# Patient Record
Sex: FEMALE | Race: WHITE | NOT HISPANIC OR LATINO | Employment: OTHER | URBAN - NONMETROPOLITAN AREA
[De-identification: names, ages, dates, MRNs, and addresses within clinical notes are randomized per-mention and may not be internally consistent; named-entity substitution may affect disease eponyms.]

---

## 2023-10-13 ENCOUNTER — DOCUMENTATION (OUTPATIENT)
Dept: FAMILY MEDICINE CLINIC | Facility: CLINIC | Age: 83
End: 2023-10-13
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
## 2023-10-13 PROBLEM — A41.9 SEPSIS: Chronic | Status: ACTIVE | Noted: 2023-10-03

## 2023-10-13 PROBLEM — K83.09 CHOLANGITIS: Status: ACTIVE | Noted: 2023-10-13

## 2023-10-13 PROBLEM — S22.050D: Status: ACTIVE | Noted: 2023-10-13

## 2023-10-13 PROBLEM — E86.0 DEHYDRATION: Status: ACTIVE | Noted: 2023-10-13

## 2023-10-13 PROBLEM — K83.1 COMMON BILE DUCT (CBD) OBSTRUCTION: Status: ACTIVE | Noted: 2023-10-03

## 2023-10-13 PROBLEM — I10 HYPERTENSION: Status: ACTIVE | Noted: 2023-10-13

## 2023-10-13 PROBLEM — S42.92XD: Status: ACTIVE | Noted: 2023-10-13

## 2023-10-13 PROBLEM — N39.0 URINARY TRACT INFECTION, SITE NOT SPECIFIED: Status: ACTIVE | Noted: 2023-10-13

## 2023-10-13 PROBLEM — G93.40 ENCEPHALOPATHY, UNSPECIFIED: Status: ACTIVE | Noted: 2023-10-13
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
## 2023-10-13 PROBLEM — M62.81 MUSCLE WEAKNESS (GENERALIZED): Status: ACTIVE | Noted: 2023-10-13

## 2023-10-13 RX ORDER — OXYCODONE HYDROCHLORIDE 5 MG/1
5 TABLET ORAL EVERY 8 HOURS PRN
COMMUNITY
End: 2023-10-17 | Stop reason: SDUPTHER

## 2023-10-13 RX ORDER — POLYETHYLENE GLYCOL 3350 17 G/17G
17 POWDER, FOR SOLUTION ORAL DAILY PRN
COMMUNITY

## 2023-10-13 RX ORDER — ACETAMINOPHEN 325 MG/1
650 TABLET ORAL EVERY 6 HOURS PRN
COMMUNITY

## 2023-10-13 RX ORDER — HEPARIN SODIUM 5000 [USP'U]/ML
1 INJECTION, SOLUTION INTRAVENOUS; SUBCUTANEOUS EVERY 12 HOURS SCHEDULED
COMMUNITY

## 2023-10-13 RX ORDER — AMLODIPINE BESYLATE 2.5 MG/1
2.5 TABLET ORAL 2 TIMES DAILY
COMMUNITY

## 2023-10-16 RX ORDER — LEVOFLOXACIN 500 MG/1
500 TABLET, FILM COATED ORAL DAILY
COMMUNITY

## 2023-10-16 RX ORDER — METRONIDAZOLE 500 MG/1
500 TABLET ORAL 3 TIMES DAILY
COMMUNITY

## 2023-10-16 RX ORDER — POTASSIUM CHLORIDE 20 MEQ/1
20 TABLET, EXTENDED RELEASE ORAL DAILY
COMMUNITY

## 2023-10-16 RX ORDER — PANTOPRAZOLE SODIUM 40 MG/1
40 TABLET, DELAYED RELEASE ORAL DAILY
COMMUNITY

## 2023-10-17 RX ORDER — OXYCODONE HYDROCHLORIDE 5 MG/1
5 TABLET ORAL EVERY 8 HOURS PRN
Qty: 90 TABLET | Refills: 0 | Status: SHIPPED | OUTPATIENT
Start: 2023-10-17

## 2023-10-17 NOTE — TELEPHONE ENCOUNTER
(NEW ADMIT)    [facility] REQUESTING MED REFILL FOR OXYCODONE 5 MG.    DIRECTIONS: OXYCODONE 5 MG, 1 TAB PO Q 8 HRS PRN.

## 2023-10-25 NOTE — PROGRESS NOTES
Nursing Home History and Physical       Vasquez Quinones DO  [address] [place] [address] Phone: [phone]  Fax: [phone]     PATIENT NAME: Ashley Younger                                                                          YOB: 1940           DATE OF SERVICE: 10/26/2023  FACILITY:  [facility]    CHIEF COMPLAINT:  Nursing facility admission      HISTORY OF PRESENT ILLNESS:   Patient is an 83-year-old female with a history of CKD stage III, brain aneurysm, orthostatic hypotension, recurrent falls, and arthritis recently hospitalized at [facility] for cholangitis with biliary stones.  Patient underwent ERCP with stone removal followed by 7 days of antibiotics for biliary infection.  Patient was also given follow-up with Dr. Bazan for surveillance radiographs 2 weeks from discharge.    On exam today, patient was resting comfortably in her wheelchair so with no specific complaints or concerns.  She seemed more distraught about comments made about her clothing.  She was sharing that she wanted to go to [facility] and obtain new clothing.  She was also focused on the care for her son who is also a facility resident at this time.          PAST MEDICAL & SURGICAL HISTORY:   Past Medical History:   Diagnosis Date    BENJIE (acute kidney injury)     Arthritis     Cholelithiasis     Chronic kidney disease, stage 3 unspecified     DJD (degenerative joint disease)     Encounter for removal of sutures     GERD (gastroesophageal reflux disease)     Headache     History of  brain aneurysm     Hyperlipidemia     Hypertension     Orthostatic hypotension     Osteoarthritis     Presence of neurostimulator     Recurrent falls     Renal insufficiency     Scalp laceration     Urinary tract infection       Past Surgical History:   Procedure Laterality Date    CHOLECYSTECTOMY      CRANIAL NEUROSTIMULATOR INSERTION/REPLACEMENT      TUMOR    ERCP      WITH STONE REMOVED    ORIF  WRIST FRACTURE Left          MEDICATIONS:  I have reviewed and reconciled the patients medication list in the patients chart at the skilled nursing facility on 10/26/2023.      ALLERGIES:  Allergies   Allergen Reactions    Penicillins Unknown - High Severity     Tolerated Rocephin 10/3/23 per med hx.    Tolerated Rocephin per med hx 10/3/23         SOCIAL HISTORY:  Social History     Socioeconomic History    Marital status: Single   Tobacco Use    Smoking status: Every Day     Types: Cigarettes    Smokeless tobacco: Never   Vaping Use    Vaping Use: Unknown   Substance and Sexual Activity    Alcohol use: Never    Drug use: Never    Sexual activity: Defer       FAMILY HISTORY:  Family History   Problem Relation Age of Onset    Hypertension Mother     Other Son         REVIEW OF SYSTEMS:  Review of Systems   Constitutional:  Negative for chills, fatigue and fever.   HENT:  Negative for congestion, ear pain, rhinorrhea, sinus pressure and sore throat.    Eyes:  Negative for visual disturbance.   Respiratory:  Negative for cough, chest tightness, shortness of breath and wheezing.    Cardiovascular:  Negative for chest pain, palpitations and leg swelling.   Gastrointestinal:  Negative for abdominal pain, blood in stool, constipation, diarrhea, nausea and vomiting.   Endocrine: Negative for polydipsia and polyuria.   Genitourinary:  Negative for dysuria and hematuria.   Musculoskeletal:  Negative for arthralgias and back pain.   Skin:  Negative for rash.   Neurological:  Negative for dizziness, light-headedness, numbness and headaches.   Psychiatric/Behavioral:  Negative for dysphoric mood and sleep disturbance. The patient is not nervous/anxious.          PHYSICAL EXAMINATION:   VITAL SIGNS: /69   Pulse 75   Temp 97.8 °F (36.6 °C)   Resp 18   Wt 50.8 kg (112 lb)   SpO2 98%     Physical Exam  Vitals and nursing note reviewed.   Constitutional:       Appearance: Normal appearance. She is well-developed.       Comments: Frail wheelchair-bound elderly female in no acute distress   HENT:      Head: Normocephalic and atraumatic.      Nose: Nose normal.      Mouth/Throat:      Mouth: Mucous membranes are moist.      Pharynx: No oropharyngeal exudate.   Eyes:      General: No scleral icterus.     Conjunctiva/sclera: Conjunctivae normal.      Pupils: Pupils are equal, round, and reactive to light.   Neck:      Thyroid: No thyromegaly.   Cardiovascular:      Rate and Rhythm: Normal rate and regular rhythm.      Heart sounds: Normal heart sounds. No murmur heard.     No friction rub. No gallop.   Pulmonary:      Effort: Pulmonary effort is normal. No respiratory distress.      Breath sounds: Normal breath sounds. No wheezing.   Abdominal:      General: Bowel sounds are normal. There is no distension.      Palpations: Abdomen is soft.      Tenderness: There is no abdominal tenderness.   Musculoskeletal:         General: No deformity or signs of injury.      Cervical back: Normal range of motion and neck supple.   Lymphadenopathy:      Cervical: No cervical adenopathy.   Skin:     General: Skin is warm and dry.      Findings: No rash.   Neurological:      Mental Status: She is alert and oriented to person, place, and time. Mental status is at baseline. She is disoriented.   Psychiatric:         Mood and Affect: Mood normal.         Behavior: Behavior normal.         RECORDS REVIEW:   Discharge Summary from [facility] 10/13/2023    ASSESSMENT   Diagnoses and all orders for this visit:    1. Common bile duct (CBD) obstruction (Primary)    2. Cholangitis    3. Acute cystitis without hematuria    4. Primary hypertension    5. Muscle weakness (generalized)    6. Fracture of left shoulder girdle, part unspecified, subsequent encounter for fracture with routine healing    7. Fall in home, subsequent encounter        PLAN  Cholangitis s/p ERCP  - No stent placed during ERCP.  Stones were removed however.  - Patient completed 3-day  course of Flagyl and Levaquin upon admission to this facility.  - Pain control with oxycodone    Fall with recent left proximal humerus fracture  - Nonsurgical management.  Left arm to remain in sling.  Follow-up with orthopedics (Dr. Bazan) as scheduled.    Dementia  - Continue supportive care for ADLs in facility.    Hyperlipidemia  - Continue atorvastatin 20 mg nightly    Hypertension  Stable on amlodipine.    GERD  - Continue Protonix.          [x]  Discussed Patient in detail with nursing/staff, addressed all needs today.     [x]  Plan of Care Reviewed   [x]  PT/OT Reviewed   []  Order Changes  []  Discharge Plans Reviewed  [x]  Advance Directive on file with Nursing Home.   [x]  POA on file with Nursing Home.    [x]  Code Status listed and reviewed.         Vasquez Quinones DO.  10/30/2023      **Part of this note may be an electronic transcription/translation of spoken language to printed text using the Dragon Dictation System.**

## 2023-10-26 ENCOUNTER — NURSING HOME (OUTPATIENT)
Dept: INTERNAL MEDICINE | Facility: CLINIC | Age: 83
End: 2023-10-26
Payer: MEDICARE

## 2023-10-26 VITALS
RESPIRATION RATE: 18 BRPM | HEART RATE: 75 BPM | SYSTOLIC BLOOD PRESSURE: 123 MMHG | DIASTOLIC BLOOD PRESSURE: 69 MMHG | TEMPERATURE: 97.8 F | WEIGHT: 112 LBS | OXYGEN SATURATION: 98 %

## 2023-10-26 DIAGNOSIS — I10 PRIMARY HYPERTENSION: ICD-10-CM
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
## 2023-10-26 DIAGNOSIS — M62.81 MUSCLE WEAKNESS (GENERALIZED): ICD-10-CM

## 2023-10-26 DIAGNOSIS — K83.09 CHOLANGITIS: ICD-10-CM

## 2023-10-26 DIAGNOSIS — K83.1 COMMON BILE DUCT (CBD) OBSTRUCTION: Primary | ICD-10-CM

## 2023-10-26 DIAGNOSIS — N30.00 ACUTE CYSTITIS WITHOUT HEMATURIA: ICD-10-CM

## 2023-10-26 DIAGNOSIS — S42.92XD FRACTURE OF LEFT SHOULDER GIRDLE, PART UNSPECIFIED, SUBSEQUENT ENCOUNTER FOR FRACTURE WITH ROUTINE HEALING: ICD-10-CM
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
## 2023-10-26 NOTE — LETTER
Nursing Home History and Physical       Vasquez Quinones DO  [address] [place] [address] Phone: [phone]  Fax: [phone]     PATIENT NAME: Ashley Yougner                                                                          YOB: 1940           DATE OF SERVICE: 10/26/2023  FACILITY:  [facility]    CHIEF COMPLAINT:  Nursing facility admission      HISTORY OF PRESENT ILLNESS:   Patient is an 83-year-old female with a history of CKD stage III, brain aneurysm, orthostatic hypotension, recurrent falls, and arthritis recently hospitalized at [facility] for cholangitis with biliary stones.  Patient underwent ERCP with stone removal followed by 7 days of antibiotics for biliary infection.  Patient was also given follow-up with Dr. Bazan for surveillance radiographs 2 weeks from discharge.    On exam today, patient was resting comfortably in her wheelchair so with no specific complaints or concerns.  She seemed more distraught about comments made about her clothing.  She was sharing that she wanted to go to [facility] and obtain new clothing.  She was also focused on the care for her son who is also a facility resident at this time.          PAST MEDICAL & SURGICAL HISTORY:   Past Medical History:   Diagnosis Date    BENJIE (acute kidney injury)     Arthritis     Cholelithiasis     Chronic kidney disease, stage 3 unspecified     DJD (degenerative joint disease)     Encounter for removal of sutures     GERD (gastroesophageal reflux disease)     Headache     History of  brain aneurysm     Hyperlipidemia     Hypertension     Orthostatic hypotension     Osteoarthritis     Presence of neurostimulator     Recurrent falls     Renal insufficiency     Scalp laceration     Urinary tract infection       Past Surgical History:   Procedure Laterality Date    CHOLECYSTECTOMY      CRANIAL NEUROSTIMULATOR INSERTION/REPLACEMENT      TUMOR    ERCP      WITH STONE REMOVED    ORIF  WRIST FRACTURE Left          MEDICATIONS:  I have reviewed and reconciled the patients medication list in the patients chart at the skilled nursing facility on 10/26/2023.      ALLERGIES:  Allergies   Allergen Reactions    Penicillins Unknown - High Severity     Tolerated Rocephin 10/3/23 per med hx.    Tolerated Rocephin per med hx 10/3/23         SOCIAL HISTORY:  Social History     Socioeconomic History    Marital status: Single   Tobacco Use    Smoking status: Every Day     Types: Cigarettes    Smokeless tobacco: Never   Vaping Use    Vaping Use: Unknown   Substance and Sexual Activity    Alcohol use: Never    Drug use: Never    Sexual activity: Defer       FAMILY HISTORY:  Family History   Problem Relation Age of Onset    Hypertension Mother     Other Son         REVIEW OF SYSTEMS:  Review of Systems   Constitutional:  Negative for chills, fatigue and fever.   HENT:  Negative for congestion, ear pain, rhinorrhea, sinus pressure and sore throat.    Eyes:  Negative for visual disturbance.   Respiratory:  Negative for cough, chest tightness, shortness of breath and wheezing.    Cardiovascular:  Negative for chest pain, palpitations and leg swelling.   Gastrointestinal:  Negative for abdominal pain, blood in stool, constipation, diarrhea, nausea and vomiting.   Endocrine: Negative for polydipsia and polyuria.   Genitourinary:  Negative for dysuria and hematuria.   Musculoskeletal:  Negative for arthralgias and back pain.   Skin:  Negative for rash.   Neurological:  Negative for dizziness, light-headedness, numbness and headaches.   Psychiatric/Behavioral:  Negative for dysphoric mood and sleep disturbance. The patient is not nervous/anxious.          PHYSICAL EXAMINATION:   VITAL SIGNS: /69   Pulse 75   Temp 97.8 °F (36.6 °C)   Resp 18   Wt 50.8 kg (112 lb)   SpO2 98%     Physical Exam  Vitals and nursing note reviewed.   Constitutional:       Appearance: Normal appearance. She is well-developed.       Comments: Frail wheelchair-bound elderly female in no acute distress   HENT:      Head: Normocephalic and atraumatic.      Nose: Nose normal.      Mouth/Throat:      Mouth: Mucous membranes are moist.      Pharynx: No oropharyngeal exudate.   Eyes:      General: No scleral icterus.     Conjunctiva/sclera: Conjunctivae normal.      Pupils: Pupils are equal, round, and reactive to light.   Neck:      Thyroid: No thyromegaly.   Cardiovascular:      Rate and Rhythm: Normal rate and regular rhythm.      Heart sounds: Normal heart sounds. No murmur heard.     No friction rub. No gallop.   Pulmonary:      Effort: Pulmonary effort is normal. No respiratory distress.      Breath sounds: Normal breath sounds. No wheezing.   Abdominal:      General: Bowel sounds are normal. There is no distension.      Palpations: Abdomen is soft.      Tenderness: There is no abdominal tenderness.   Musculoskeletal:         General: No deformity or signs of injury.      Cervical back: Normal range of motion and neck supple.   Lymphadenopathy:      Cervical: No cervical adenopathy.   Skin:     General: Skin is warm and dry.      Findings: No rash.   Neurological:      Mental Status: She is alert and oriented to person, place, and time. Mental status is at baseline. She is disoriented.   Psychiatric:         Mood and Affect: Mood normal.         Behavior: Behavior normal.         RECORDS REVIEW:   Discharge Summary from [facility] 10/13/2023    ASSESSMENT   Diagnoses and all orders for this visit:    1. Common bile duct (CBD) obstruction (Primary)    2. Cholangitis    3. Acute cystitis without hematuria    4. Primary hypertension    5. Muscle weakness (generalized)    6. Fracture of left shoulder girdle, part unspecified, subsequent encounter for fracture with routine healing    7. Fall in home, subsequent encounter        PLAN  Cholangitis s/p ERCP  - No stent placed during ERCP.  Stones were removed however.  - Patient completed 3-day  course of Flagyl and Levaquin upon admission to this facility.  - Pain control with oxycodone    Fall with recent left proximal humerus fracture  - Nonsurgical management.  Left arm to remain in sling.  Follow-up with orthopedics (Dr. Bazan) as scheduled.    Dementia  - Continue supportive care for ADLs in facility.    Hyperlipidemia  - Continue atorvastatin 20 mg nightly    Hypertension  Stable on amlodipine.    GERD  - Continue Protonix.          [x]  Discussed Patient in detail with nursing/staff, addressed all needs today.     [x]  Plan of Care Reviewed   [x]  PT/OT Reviewed   []  Order Changes  []  Discharge Plans Reviewed  [x]  Advance Directive on file with Nursing Home.   [x]  POA on file with Nursing Home.    [x]  Code Status listed and reviewed.         Vasquez Quinones DO.  10/30/2023      **Part of this note may be an electronic transcription/translation of spoken language to printed text using the Dragon Dictation System.**

## 2025-02-11 ENCOUNTER — APPOINTMENT (OUTPATIENT)
Dept: GENERAL RADIOLOGY | Facility: HOSPITAL | Age: 85
DRG: 193 | End: 2025-02-11
Payer: MEDICARE

## 2025-02-11 ENCOUNTER — HOSPITAL ENCOUNTER (INPATIENT)
Facility: HOSPITAL | Age: 85
LOS: 6 days | Discharge: NURSING, REHAB OR LONG-TERM CARE | DRG: 193 | End: 2025-02-17
Attending: STUDENT IN AN ORGANIZED HEALTH CARE EDUCATION/TRAINING PROGRAM | Admitting: STUDENT IN AN ORGANIZED HEALTH CARE EDUCATION/TRAINING PROGRAM
Payer: MEDICARE

## 2025-02-11 DIAGNOSIS — J10.1 INFLUENZA A: ICD-10-CM

## 2025-02-11 DIAGNOSIS — R13.12 OROPHARYNGEAL DYSPHAGIA: ICD-10-CM

## 2025-02-11 DIAGNOSIS — N39.0 ACUTE UTI (URINARY TRACT INFECTION): ICD-10-CM

## 2025-02-11 DIAGNOSIS — R09.02 HYPOXIA: Primary | ICD-10-CM

## 2025-02-11 PROBLEM — J11.1 INFLUENZA: Status: ACTIVE | Noted: 2025-02-11

## 2025-02-11 LAB
ALBUMIN SERPL-MCNC: 3.9 G/DL (ref 3.5–5.2)
ALBUMIN/GLOB SERPL: 1.4 G/DL
ALP SERPL-CCNC: 76 U/L (ref 39–117)
ALT SERPL W P-5'-P-CCNC: 9 U/L (ref 1–33)
ANION GAP SERPL CALCULATED.3IONS-SCNC: 8 MMOL/L (ref 5–15)
AST SERPL-CCNC: 31 U/L (ref 1–32)
BACTERIA UR QL AUTO: ABNORMAL /HPF
BASOPHILS # BLD AUTO: 0.02 10*3/MM3 (ref 0–0.2)
BASOPHILS NFR BLD AUTO: 0.4 % (ref 0–1.5)
BILIRUB SERPL-MCNC: 0.4 MG/DL (ref 0–1.2)
BILIRUB UR QL STRIP: NEGATIVE
BUN SERPL-MCNC: 18 MG/DL (ref 8–23)
BUN/CREAT SERPL: 24 (ref 7–25)
CALCIUM SPEC-SCNC: 8.3 MG/DL (ref 8.6–10.5)
CHLORIDE SERPL-SCNC: 103 MMOL/L (ref 98–107)
CLARITY UR: CLEAR
CO2 SERPL-SCNC: 30 MMOL/L (ref 22–29)
COLOR UR: YELLOW
CREAT SERPL-MCNC: 0.75 MG/DL (ref 0.57–1)
D-LACTATE SERPL-SCNC: 0.7 MMOL/L (ref 0.5–2)
DEPRECATED RDW RBC AUTO: 52.4 FL (ref 37–54)
EGFRCR SERPLBLD CKD-EPI 2021: 79.6 ML/MIN/1.73
EOSINOPHIL # BLD AUTO: 0.02 10*3/MM3 (ref 0–0.4)
EOSINOPHIL NFR BLD AUTO: 0.4 % (ref 0.3–6.2)
ERYTHROCYTE [DISTWIDTH] IN BLOOD BY AUTOMATED COUNT: 14.3 % (ref 12.3–15.4)
FLUAV RNA RESP QL NAA+PROBE: DETECTED
FLUBV RNA RESP QL NAA+PROBE: NOT DETECTED
GLOBULIN UR ELPH-MCNC: 2.8 GM/DL
GLUCOSE SERPL-MCNC: 104 MG/DL (ref 65–99)
GLUCOSE UR STRIP-MCNC: NEGATIVE MG/DL
HCT VFR BLD AUTO: 42.2 % (ref 34–46.6)
HGB BLD-MCNC: 13.2 G/DL (ref 12–15.9)
HGB UR QL STRIP.AUTO: ABNORMAL
HYALINE CASTS UR QL AUTO: ABNORMAL /LPF
IMM GRANULOCYTES # BLD AUTO: 0.01 10*3/MM3 (ref 0–0.05)
IMM GRANULOCYTES NFR BLD AUTO: 0.2 % (ref 0–0.5)
KETONES UR QL STRIP: ABNORMAL
LEUKOCYTE ESTERASE UR QL STRIP.AUTO: ABNORMAL
LYMPHOCYTES # BLD AUTO: 1.27 10*3/MM3 (ref 0.7–3.1)
LYMPHOCYTES NFR BLD AUTO: 26.2 % (ref 19.6–45.3)
MAGNESIUM SERPL-MCNC: 1.9 MG/DL (ref 1.6–2.4)
MCH RBC QN AUTO: 31 PG (ref 26.6–33)
MCHC RBC AUTO-ENTMCNC: 31.3 G/DL (ref 31.5–35.7)
MCV RBC AUTO: 99.1 FL (ref 79–97)
MONOCYTES # BLD AUTO: 0.89 10*3/MM3 (ref 0.1–0.9)
MONOCYTES NFR BLD AUTO: 18.4 % (ref 5–12)
NEUTROPHILS NFR BLD AUTO: 2.63 10*3/MM3 (ref 1.7–7)
NEUTROPHILS NFR BLD AUTO: 54.4 % (ref 42.7–76)
NITRITE UR QL STRIP: POSITIVE
NRBC BLD AUTO-RTO: 0 /100 WBC (ref 0–0.2)
PH UR STRIP.AUTO: 5.5 [PH] (ref 5–8)
PLATELET # BLD AUTO: 164 10*3/MM3 (ref 140–450)
PMV BLD AUTO: 11.1 FL (ref 6–12)
POTASSIUM SERPL-SCNC: 3.9 MMOL/L (ref 3.5–5.2)
PROT SERPL-MCNC: 6.7 G/DL (ref 6–8.5)
PROT UR QL STRIP: ABNORMAL
RBC # BLD AUTO: 4.26 10*6/MM3 (ref 3.77–5.28)
RBC # UR STRIP: ABNORMAL /HPF
REF LAB TEST METHOD: ABNORMAL
RSV RNA RESP QL NAA+PROBE: NOT DETECTED
SARS-COV-2 RNA RESP QL NAA+PROBE: NOT DETECTED
SODIUM SERPL-SCNC: 141 MMOL/L (ref 136–145)
SP GR UR STRIP: 1.02 (ref 1–1.03)
SQUAMOUS #/AREA URNS HPF: ABNORMAL /HPF
UROBILINOGEN UR QL STRIP: ABNORMAL
WBC # UR STRIP: ABNORMAL /HPF
WBC NRBC COR # BLD AUTO: 4.84 10*3/MM3 (ref 3.4–10.8)

## 2025-02-11 PROCEDURE — 99285 EMERGENCY DEPT VISIT HI MDM: CPT

## 2025-02-11 PROCEDURE — 94640 AIRWAY INHALATION TREATMENT: CPT

## 2025-02-11 PROCEDURE — 25010000002 HEPARIN (PORCINE) PER 1000 UNITS

## 2025-02-11 PROCEDURE — 93005 ELECTROCARDIOGRAM TRACING: CPT | Performed by: STUDENT IN AN ORGANIZED HEALTH CARE EDUCATION/TRAINING PROGRAM

## 2025-02-11 PROCEDURE — 85025 COMPLETE CBC W/AUTO DIFF WBC: CPT | Performed by: STUDENT IN AN ORGANIZED HEALTH CARE EDUCATION/TRAINING PROGRAM

## 2025-02-11 PROCEDURE — 80053 COMPREHEN METABOLIC PANEL: CPT | Performed by: STUDENT IN AN ORGANIZED HEALTH CARE EDUCATION/TRAINING PROGRAM

## 2025-02-11 PROCEDURE — 71045 X-RAY EXAM CHEST 1 VIEW: CPT

## 2025-02-11 PROCEDURE — 81001 URINALYSIS AUTO W/SCOPE: CPT | Performed by: STUDENT IN AN ORGANIZED HEALTH CARE EDUCATION/TRAINING PROGRAM

## 2025-02-11 PROCEDURE — 83735 ASSAY OF MAGNESIUM: CPT | Performed by: STUDENT IN AN ORGANIZED HEALTH CARE EDUCATION/TRAINING PROGRAM

## 2025-02-11 PROCEDURE — 99222 1ST HOSP IP/OBS MODERATE 55: CPT

## 2025-02-11 PROCEDURE — 36415 COLL VENOUS BLD VENIPUNCTURE: CPT

## 2025-02-11 PROCEDURE — P9612 CATHETERIZE FOR URINE SPEC: HCPCS

## 2025-02-11 PROCEDURE — 87637 SARSCOV2&INF A&B&RSV AMP PRB: CPT | Performed by: STUDENT IN AN ORGANIZED HEALTH CARE EDUCATION/TRAINING PROGRAM

## 2025-02-11 PROCEDURE — 83605 ASSAY OF LACTIC ACID: CPT | Performed by: STUDENT IN AN ORGANIZED HEALTH CARE EDUCATION/TRAINING PROGRAM

## 2025-02-11 RX ORDER — SODIUM CHLORIDE 9 MG/ML
40 INJECTION, SOLUTION INTRAVENOUS AS NEEDED
Status: DISCONTINUED | OUTPATIENT
Start: 2025-02-11 | End: 2025-02-17 | Stop reason: HOSPADM

## 2025-02-11 RX ORDER — AMLODIPINE BESYLATE 2.5 MG/1
2.5 TABLET ORAL 2 TIMES DAILY
Status: DISCONTINUED | OUTPATIENT
Start: 2025-02-11 | End: 2025-02-17 | Stop reason: HOSPADM

## 2025-02-11 RX ORDER — BISACODYL 10 MG
10 SUPPOSITORY, RECTAL RECTAL DAILY PRN
Status: DISCONTINUED | OUTPATIENT
Start: 2025-02-11 | End: 2025-02-17 | Stop reason: HOSPADM

## 2025-02-11 RX ORDER — AMOXICILLIN 250 MG
2 CAPSULE ORAL 2 TIMES DAILY PRN
Status: DISCONTINUED | OUTPATIENT
Start: 2025-02-11 | End: 2025-02-17 | Stop reason: HOSPADM

## 2025-02-11 RX ORDER — POLYETHYLENE GLYCOL 3350 17 G/17G
17 POWDER, FOR SOLUTION ORAL DAILY PRN
Status: DISCONTINUED | OUTPATIENT
Start: 2025-02-11 | End: 2025-02-17 | Stop reason: HOSPADM

## 2025-02-11 RX ORDER — OSELTAMIVIR PHOSPHATE 30 MG/1
30 CAPSULE ORAL EVERY 12 HOURS SCHEDULED
Status: COMPLETED | OUTPATIENT
Start: 2025-02-11 | End: 2025-02-16

## 2025-02-11 RX ORDER — ATORVASTATIN CALCIUM 20 MG/1
20 TABLET, FILM COATED ORAL NIGHTLY
Status: DISCONTINUED | OUTPATIENT
Start: 2025-02-11 | End: 2025-02-17 | Stop reason: HOSPADM

## 2025-02-11 RX ORDER — ACETAMINOPHEN 325 MG/1
650 TABLET ORAL EVERY 4 HOURS PRN
Status: DISCONTINUED | OUTPATIENT
Start: 2025-02-11 | End: 2025-02-17 | Stop reason: HOSPADM

## 2025-02-11 RX ORDER — BISACODYL 5 MG/1
5 TABLET, DELAYED RELEASE ORAL DAILY PRN
Status: DISCONTINUED | OUTPATIENT
Start: 2025-02-11 | End: 2025-02-17 | Stop reason: HOSPADM

## 2025-02-11 RX ORDER — PANTOPRAZOLE SODIUM 40 MG/1
40 TABLET, DELAYED RELEASE ORAL DAILY
Status: DISCONTINUED | OUTPATIENT
Start: 2025-02-12 | End: 2025-02-17 | Stop reason: HOSPADM

## 2025-02-11 RX ORDER — NITROGLYCERIN 0.4 MG/1
0.4 TABLET SUBLINGUAL
Status: DISCONTINUED | OUTPATIENT
Start: 2025-02-11 | End: 2025-02-17 | Stop reason: HOSPADM

## 2025-02-11 RX ORDER — GUAIFENESIN 600 MG/1
600 TABLET, EXTENDED RELEASE ORAL EVERY 12 HOURS SCHEDULED
Status: DISCONTINUED | OUTPATIENT
Start: 2025-02-11 | End: 2025-02-17 | Stop reason: HOSPADM

## 2025-02-11 RX ORDER — DONEPEZIL HYDROCHLORIDE 5 MG/1
5 TABLET, FILM COATED ORAL NIGHTLY
Status: DISCONTINUED | OUTPATIENT
Start: 2025-02-11 | End: 2025-02-17 | Stop reason: HOSPADM

## 2025-02-11 RX ORDER — GRANULES FOR ORAL 3 G/1
3 POWDER ORAL ONCE
Status: COMPLETED | OUTPATIENT
Start: 2025-02-11 | End: 2025-02-11

## 2025-02-11 RX ORDER — ACETAMINOPHEN 160 MG/5ML
650 SOLUTION ORAL EVERY 4 HOURS PRN
Status: DISCONTINUED | OUTPATIENT
Start: 2025-02-11 | End: 2025-02-17 | Stop reason: HOSPADM

## 2025-02-11 RX ORDER — SODIUM CHLORIDE 0.9 % (FLUSH) 0.9 %
10 SYRINGE (ML) INJECTION AS NEEDED
Status: DISCONTINUED | OUTPATIENT
Start: 2025-02-11 | End: 2025-02-17 | Stop reason: HOSPADM

## 2025-02-11 RX ORDER — ACETAMINOPHEN 650 MG/1
650 SUPPOSITORY RECTAL EVERY 4 HOURS PRN
Status: DISCONTINUED | OUTPATIENT
Start: 2025-02-11 | End: 2025-02-17 | Stop reason: HOSPADM

## 2025-02-11 RX ORDER — FUROSEMIDE 20 MG/1
20 TABLET ORAL DAILY
Status: DISCONTINUED | OUTPATIENT
Start: 2025-02-11 | End: 2025-02-17 | Stop reason: HOSPADM

## 2025-02-11 RX ORDER — SODIUM CHLORIDE 0.9 % (FLUSH) 0.9 %
10 SYRINGE (ML) INJECTION EVERY 12 HOURS SCHEDULED
Status: DISCONTINUED | OUTPATIENT
Start: 2025-02-11 | End: 2025-02-17 | Stop reason: HOSPADM

## 2025-02-11 RX ORDER — IPRATROPIUM BROMIDE AND ALBUTEROL SULFATE 2.5; .5 MG/3ML; MG/3ML
3 SOLUTION RESPIRATORY (INHALATION) ONCE
Status: COMPLETED | OUTPATIENT
Start: 2025-02-11 | End: 2025-02-11

## 2025-02-11 RX ORDER — HEPARIN SODIUM 5000 [USP'U]/ML
5000 INJECTION, SOLUTION INTRAVENOUS; SUBCUTANEOUS EVERY 12 HOURS SCHEDULED
Status: DISCONTINUED | OUTPATIENT
Start: 2025-02-11 | End: 2025-02-17 | Stop reason: HOSPADM

## 2025-02-11 RX ADMIN — Medication 5 MG: at 22:03

## 2025-02-11 RX ADMIN — OSELTAMIVIR PHOSPHATE 30 MG: 30 CAPSULE ORAL at 22:03

## 2025-02-11 RX ADMIN — GRANULES FOR ORAL SOLUTION 3 G: 3 POWDER ORAL at 15:51

## 2025-02-11 RX ADMIN — ATORVASTATIN CALCIUM 20 MG: 20 TABLET, FILM COATED ORAL at 22:03

## 2025-02-11 RX ADMIN — DONEPEZIL HYDROCHLORIDE 5 MG: 5 TABLET ORAL at 22:02

## 2025-02-11 RX ADMIN — Medication 10 ML: at 22:04

## 2025-02-11 RX ADMIN — GUAIFENESIN 600 MG: 600 TABLET ORAL at 19:25

## 2025-02-11 RX ADMIN — HEPARIN SODIUM 5000 UNITS: 5000 INJECTION INTRAVENOUS; SUBCUTANEOUS at 22:03

## 2025-02-11 RX ADMIN — FUROSEMIDE 20 MG: 20 TABLET ORAL at 19:25

## 2025-02-11 RX ADMIN — IPRATROPIUM BROMIDE AND ALBUTEROL SULFATE 3 ML: 2.5; .5 SOLUTION RESPIRATORY (INHALATION) at 16:00

## 2025-02-11 RX ADMIN — AMLODIPINE BESYLATE 2.5 MG: 2.5 TABLET ORAL at 22:03

## 2025-02-11 NOTE — ED PROVIDER NOTES
EMERGENCY DEPARTMENT ENCOUNTER    Pt Name: Ashley Younger  MRN: [id number]  Pt :   1940  Room Number:    Date of encounter:  2025  PCP: John Chen MD  ED Provider: Satish Gregorio MD    Historian: EMS, patient, daughter by telephone      HPI:  Chief Complaint: Possible flu        Context: Ashley Younger is a 84-year-old woman with dementia and chronic hypoxia baseline oxygen of 2 L who presents from her nursing facility for concern of possible influenza.  She has reportedly been coughing and having other flulike symptoms and her daughter wanted her checked so she was sent here for evaluation.  Upon arrival here she says she feels well     Note spoke with the daughter by telephone and the initial reported baseline 2 L oxygen requirement was missed reported, she was only started on this 2 days ago in the setting of her current illness    PAST MEDICAL HISTORY  Past Medical History:   Diagnosis Date    BENJIE (acute kidney injury)     Arthritis     Cholelithiasis     Chronic kidney disease, stage 3 unspecified     DJD (degenerative joint disease)     Encounter for removal of sutures     GERD (gastroesophageal reflux disease)     Headache     History of  brain aneurysm     Hyperlipidemia     Hypertension     Orthostatic hypotension     Osteoarthritis     Presence of neurostimulator     Recurrent falls     Renal insufficiency     Scalp laceration     Urinary tract infection          PAST SURGICAL HISTORY  Past Surgical History:   Procedure Laterality Date    CHOLECYSTECTOMY      CRANIAL NEUROSTIMULATOR INSERTION/REPLACEMENT      TUMOR    ERCP      WITH STONE REMOVED    ORIF WRIST FRACTURE Left          FAMILY HISTORY  Family History   Problem Relation Age of Onset    Hypertension Mother     Other Son          SOCIAL HISTORY  Social History     Socioeconomic History    Marital status: Single   Tobacco Use    Smoking status: Every Day     Types: Cigarettes    Smokeless tobacco: Never    Vaping Use    Vaping status: Unknown   Substance and Sexual Activity    Alcohol use: Never    Drug use: Never    Sexual activity: Defer         ALLERGIES  Penicillins        REVIEW OF SYSTEMS  Review of Systems       All systems reviewed and negative except for those discussed in HPI.       PHYSICAL EXAM    I have reviewed the triage vital signs and nursing notes.    ED Triage Vitals   Temp Heart Rate Resp BP SpO2   02/11/25 1103 02/11/25 1103 02/11/25 1103 02/11/25 1103 02/11/25 1103   98.6 °F (37 °C) 82 18 130/77 95 %      Temp src Heart Rate Source Patient Position BP Location FiO2 (%)   02/11/25 1103 02/11/25 1300 02/11/25 1103 02/11/25 1103 --   Oral Monitor Sitting Left arm        Physical Exam  GENERAL:   Appears acute on chronically ill-appearing  HENT: Nares patent.  Nasal cannula in place  EYES: No scleral icterus.  CV: Regular rhythm, regular rate.  RESPIRATORY: Normal effort.  Diffuse impressively wet rhonchorous breath sounds  ABDOMEN: Soft, nontender  MUSCULOSKELETAL: No deformities.   NEURO: Alert but confused in conversation this was reported to be baseline, moves all extremities, follows commands.  SKIN: Warm, dry, no rash visualized.      LAB RESULTS  Recent Results (from the past 24 hours)   ECG 12 Lead Dyspnea    Collection Time: 02/11/25 11:26 AM   Result Value Ref Range    QT Interval 364 ms    QTC Interval 419 ms   COVID-19, FLU A/B, RSV PCR 1 HR TAT - Swab, Nasopharynx    Collection Time: 02/11/25 11:28 AM    Specimen: Nasopharynx; Swab   Result Value Ref Range    COVID19 Not Detected Not Detected - Ref. Range    Influenza A PCR Detected (A) Not Detected    Influenza B PCR Not Detected Not Detected    RSV, PCR Not Detected Not Detected   Comprehensive Metabolic Panel    Collection Time: 02/11/25 11:48 AM    Specimen: Blood   Result Value Ref Range    Glucose 104 (H) 65 - 99 mg/dL    BUN 18 8 - 23 mg/dL    Creatinine 0.75 0.57 - 1.00 mg/dL    Sodium 141 136 - 145 mmol/L    Potassium 3.9 3.5  - 5.2 mmol/L    Chloride 103 98 - 107 mmol/L    CO2 30.0 (H) 22.0 - 29.0 mmol/L    Calcium 8.3 (L) 8.6 - 10.5 mg/dL    Total Protein 6.7 6.0 - 8.5 g/dL    Albumin 3.9 3.5 - 5.2 g/dL    ALT (SGPT) 9 1 - 33 U/L    AST (SGOT) 31 1 - 32 U/L    Alkaline Phosphatase 76 39 - 117 U/L    Total Bilirubin 0.4 0.0 - 1.2 mg/dL    Globulin 2.8 gm/dL    A/G Ratio 1.4 g/dL    BUN/Creatinine Ratio 24.0 7.0 - 25.0    Anion Gap 8.0 5.0 - 15.0 mmol/L    eGFR 79.6 >60.0 mL/min/1.73   Lactic Acid, Plasma    Collection Time: 02/11/25 11:48 AM    Specimen: Blood   Result Value Ref Range    Lactate 0.7 0.5 - 2.0 mmol/L   Magnesium    Collection Time: 02/11/25 11:48 AM    Specimen: Blood   Result Value Ref Range    Magnesium 1.9 1.6 - 2.4 mg/dL   CBC Auto Differential    Collection Time: 02/11/25 11:48 AM    Specimen: Blood   Result Value Ref Range    WBC 4.84 3.40 - 10.80 10*3/mm3    RBC 4.26 3.77 - 5.28 10*6/mm3    Hemoglobin 13.2 12.0 - 15.9 g/dL    Hematocrit 42.2 34.0 - 46.6 %    MCV 99.1 (H) 79.0 - 97.0 fL    MCH 31.0 26.6 - 33.0 pg    MCHC 31.3 (L) 31.5 - 35.7 g/dL    RDW 14.3 12.3 - 15.4 %    RDW-SD 52.4 37.0 - 54.0 fl    MPV 11.1 6.0 - 12.0 fL    Platelets 164 140 - 450 10*3/mm3    Neutrophil % 54.4 42.7 - 76.0 %    Lymphocyte % 26.2 19.6 - 45.3 %    Monocyte % 18.4 (H) 5.0 - 12.0 %    Eosinophil % 0.4 0.3 - 6.2 %    Basophil % 0.4 0.0 - 1.5 %    Immature Grans % 0.2 0.0 - 0.5 %    Neutrophils, Absolute 2.63 1.70 - 7.00 10*3/mm3    Lymphocytes, Absolute 1.27 0.70 - 3.10 10*3/mm3    Monocytes, Absolute 0.89 0.10 - 0.90 10*3/mm3    Eosinophils, Absolute 0.02 0.00 - 0.40 10*3/mm3    Basophils, Absolute 0.02 0.00 - 0.20 10*3/mm3    Immature Grans, Absolute 0.01 0.00 - 0.05 10*3/mm3    nRBC 0.0 0.0 - 0.2 /100 WBC   Urinalysis With Microscopic If Indicated (No Culture) - Straight Cath    Collection Time: 02/11/25  1:38 PM    Specimen: Straight Cath; Urine   Result Value Ref Range    Color, UA Yellow Yellow, Straw    Appearance, UA Clear  Clear    pH, UA 5.5 5.0 - 8.0    Specific Gravity, UA 1.023 1.001 - 1.030    Glucose, UA Negative Negative    Ketones, UA 15 mg/dL (1+) (A) Negative    Bilirubin, UA Negative Negative    Blood, UA Moderate (2+) (A) Negative    Protein, UA Trace (A) Negative    Leuk Esterase, UA Trace (A) Negative    Nitrite, UA Positive (A) Negative    Urobilinogen, UA 1.0 E.U./dL 0.2 - 1.0 E.U./dL   Urinalysis, Microscopic Only - Straight Cath    Collection Time: 02/11/25  1:38 PM    Specimen: Straight Cath; Urine   Result Value Ref Range    RBC, UA 0-2 None Seen, 0-2 /HPF    WBC, UA 6-10 (A) None Seen, 0-2 /HPF    Bacteria, UA 4+ (A) None Seen, Trace /HPF    Squamous Epithelial Cells, UA 0-2 None Seen, 0-2 /HPF    Hyaline Casts, UA 0-6 0 - 6 /LPF    Methodology Manual Light Microscopy        If labs were ordered, I independently reviewed the results and considered them in treating the patient.        RADIOLOGY  XR Chest 1 View    Result Date: 2/11/2025  XR CHEST 1 VW Date of Exam: 2/11/2025 11:18 AM EST Indication: productive cough, dementia with aspiration risk Comparison: AP chest 3/18/2025. Findings: Low volume inspiration. No acute airspace disease is seen. Heart size appears borderline prominent, favored to represent accentuated contour related to kyphotic positioning. No pleural effusion or pneumothorax or acute osseous abnormalities are identified. Upper lumbar levoscoliosis. Cholecystectomy.     Impression: No acute cardiopulmonary findings. Electronically Signed: Maria Del Carmen Zuniga MD  2/11/2025 11:59 AM EST  Workstation ID: [id number]     I ordered and independently reviewed the above noted radiographic studies.      I viewed images of chest x-ray which showed no acute pathology per my independent interpretation.    See radiologist's dictation for official interpretation.        PROCEDURES    Procedures    ECG 12 Lead Dyspnea   Preliminary Result   Test Reason : Dyspnea   Blood Pressure :   */*   mmHG   Vent. Rate :  80 BPM      Atrial Rate :  80 BPM      P-R Int : 116 ms          QRS Dur :  70 ms       QT Int : 364 ms       P-R-T Axes :  39   5 -26 degrees     QTcB Int : 419 ms      Normal sinus rhythm   Cannot rule out Anterior infarct , age undetermined   Abnormal ECG   No previous ECGs available      Referred By: EDMD           Confirmed By:           MEDICATIONS GIVEN IN ER    Medications   fosfomycin (MONUROL) packet 3 g (has no administration in time range)   ipratropium-albuterol (DUO-NEB) nebulizer solution 3 mL (has no administration in time range)         MEDICAL DECISION MAKING, PROGRESS, and CONSULTS    All labs, if obtained, have been independently reviewed by me.  All radiology studies, if obtained, have been reviewed by me and the radiologist dictating the report.  All EKGs, if obtained, have been independently viewed and interpreted by me/my attending physician.      Discussion below represents my analysis of pertinent findings related to patient's condition, differential diagnosis, treatment plan and final disposition.                                          Differential diagnosis:    Flu, pneumonia, respiratory failure, anemia, electrolyte abnormality, urinary tract infection      Additional sources:    - Discussed/ obtained information from independent historians: EMS and daughter by telephone    - External (non-ED) record review:  anahi review of internal medicine note shows history of:  CKD stage III, brain aneurysm, orthostatic hypotension, recurrent falls, and arthritis     - Chronic or social conditions impacting care: Dementia, chronic kidney disease, emphysema        Orders placed during this visit:  Orders Placed This Encounter   Procedures    COVID PRE-OP / PRE-PROCEDURE SCREENING ORDER (NO ISOLATION) - Swab, Nasopharynx    COVID-19, FLU A/B, RSV PCR 1 HR TAT - Swab, Nasopharynx    XR Chest 1 View    Comprehensive Metabolic Panel    Urinalysis With Microscopic If Indicated (No Culture) - Urine, Catheter     Lactic Acid, Plasma    Magnesium    CBC Auto Differential    Urinalysis, Microscopic Only - Urine, Clean Catch    ECG 12 Lead Dyspnea    CBC & Differential         Additional orders considered but not ordered:      ED Course:    Consultants: Hospital medicine    ED Course as of 02/11/25 1456   e Feb 11, 2025   1114 Chart review of internal medicine note shows history of:  CKD stage III, brain aneurysm, orthostatic hypotension, recurrent falls, and arthritis  [CC]   1115 This is an 84-year-old woman with dementia and chronic hypoxia baseline oxygen of 2 L who presents from her nursing facility for concern of possible influenza.  She has reportedly been coughing and having other flulike symptoms and her daughter wanted her checked so she was sent here for evaluation.  Upon arrival here she says she feels well [CC]   1454 She arrived awake but confused reported to be at her neurologic baseline.  Oxygen staying in the 90s between 2 and 3 L nasal cannula this was initially reported by EMS to be a chronic situation but we contacted the daughter who says she was only started on this 2 days ago during her illness.  Viral panel is positive for influenza  CBC and CMP generally reassuring and nonactionable.  Urinalysis does show UTI with pyuria nitrite and bacteria.  Given single dose fosfomycin here.  With her new hypoxia in the setting of influenza complicated by underlying emphysema I think she would benefit from hospitalization.  Medicine team consulted for admission. [CC]      ED Course User Index  [CC] Satish Gregorio MD              Shared Decision Making:  After my consideration of clinical presentation and any laboratory/radiology studies obtained, I discussed the findings with the patient/patient representative who is in agreement with the treatment plan and the final disposition.   Risks and benefits of discharge and/or observation/admission were discussed.       AS OF 14:56 EST VITALS:    BP - 124/63  HR -  77  TEMP - 98.6 °F (37 °C) (Oral)  O2 SATS - 96%                  DIAGNOSIS  Final diagnoses:   Hypoxia   Influenza A   Acute UTI (urinary tract infection)         DISPOSITION  Admit      Please note that portions of this document were completed with voice recognition software.        Satish Gregorio MD  02/11/25 2839

## 2025-02-11 NOTE — H&P
[facility] Medicine Services  HISTORY AND PHYSICAL    Patient Name: Ashley Younger  : 1940  MRN: [id number]  Primary Care Physician: John Chen MD  Date of admission: 2025    Subjective   Subjective     Chief Complaint:  Hypoxia    HPI:  Ashley Younger is a 84 y.o. female with PMH significant for dementia, GERD, HLD, HTN presents to [facility] ED for complaint of hypoxia. Contacted daughter for history as patient has dementia. Daughter states she was contacted by [facility] and informed the patient's O2 saturations were in the 80s and she was febrile. Daughter the patient has never been prescribed O2 however she has a remote history of COPD and shared supplemental O2 with her family member prior to moving into her nursing home about 1 year ago.  Patient is afebrile on arrival, she is complaining of productive cough with clear sputum, sore throat, and congestion.    Review of Systems   Constitutional:  Negative for chills and fever.   HENT:  Positive for congestion and sore throat.    Respiratory:  Positive for cough and shortness of breath.    Cardiovascular:  Negative for chest pain.   Gastrointestinal:  Negative for nausea and vomiting.        Personal History     Past Medical History:   Diagnosis Date    BENJIE (acute kidney injury)     Arthritis     Cholelithiasis     Chronic kidney disease, stage 3 unspecified     DJD (degenerative joint disease)     Encounter for removal of sutures     GERD (gastroesophageal reflux disease)     Headache     History of  brain aneurysm     Hyperlipidemia     Hypertension     Orthostatic hypotension     Osteoarthritis     Presence of neurostimulator     Recurrent falls     Renal insufficiency     Scalp laceration     Urinary tract infection              Past Surgical History:   Procedure Laterality Date    CHOLECYSTECTOMY      CRANIAL NEUROSTIMULATOR INSERTION/REPLACEMENT      TUMOR    ERCP      WITH STONE REMOVED    ORIF WRIST  FRACTURE Left        Family History:  family history includes Hypertension in her mother; Other in her son.     Social History:  reports that she has been smoking cigarettes. She has never used smokeless tobacco. She reports that she does not drink alcohol and does not use drugs.  Social History     Social History Narrative    Not on file       Medications:  acetaminophen, amLODIPine, heparin (porcine), levoFLOXacin, metoprolol tartrate, metroNIDAZOLE, oxyCODONE, pantoprazole, polyethylene glycol, and potassium chloride    Allergies   Allergen Reactions    Penicillins Unknown - High Severity     Tolerated Rocephin 10/3/23 per med hx.    Tolerated Rocephin per med hx 10/3/23       Objective   Objective     Vital Signs:   Temp:  [98.6 °F (37 °C)] 98.6 °F (37 °C)  Heart Rate:  [75-89] 89  Resp:  [18] 18  BP: (110-142)/(49-98) 134/67  Flow (L/min) (Oxygen Therapy):  [2] 2    Physical Exam   Constitutional: Elderly appearing woman sitting up in bed in NAD  HENT: NCAT, mucous membranes moist  Respiratory: Rhonchi on auscultation bilaterally, nonlabored respirations on 2L NC  Cardiovascular: RRR, no murmurs  Gastrointestinal: Soft, nontender, nondistended  Musculoskeletal: Muscle tone decreased c/w advanced age  Psychiatric: Appropriate affect, cooperative  Neurologic: Dementia, follows commands, speech clear  Skin: No rashes on exposed skin    Result Review:  I have personally reviewed the results from the time of this admission to 2/11/2025 17:36 EST and agree with these findings:  [x]  Laboratory list / accordion  [x]  Microbiology  [x]  Radiology  [x]  EKG/Telemetry   []  Cardiology/Vascular   []  Pathology  []  Old records  []  Other:  Most notable findings include: U/A with + nitrites, trace leuks, 2+ blood, 6-10 WBCs, 4+ bacteria, Influenza A +, CXR negative for acute findings    LAB RESULTS:      Lab 02/11/25  1148   WBC 4.84   HEMOGLOBIN 13.2   HEMATOCRIT 42.2   PLATELETS 164   NEUTROS ABS 2.63   IMMATURE GRANS  (ABS) 0.01   LYMPHS ABS 1.27   MONOS ABS 0.89   EOS ABS 0.02   MCV 99.1*   LACTATE 0.7         Lab 02/11/25  1148   SODIUM 141   POTASSIUM 3.9   CHLORIDE 103   CO2 30.0*   ANION GAP 8.0   BUN 18   CREATININE 0.75   EGFR 79.6   GLUCOSE 104*   CALCIUM 8.3*   MAGNESIUM 1.9         Lab 02/11/25  1148   TOTAL PROTEIN 6.7   ALBUMIN 3.9   GLOBULIN 2.8   ALT (SGPT) 9   AST (SGOT) 31   BILIRUBIN 0.4   ALK PHOS 76                     Brief Urine Lab Results  (Last result in the past 365 days)        Color   Clarity   Blood   Leuk Est   Nitrite   Protein   CREAT   Urine HCG        02/11/25 1338 Yellow   Clear   Moderate (2+)   Trace   Positive   Trace                 Microbiology Results (last 10 days)       Procedure Component Value - Date/Time    COVID PRE-OP / PRE-PROCEDURE SCREENING ORDER (NO ISOLATION) - Swab, Nasopharynx [[id number]]  (Abnormal) Collected: 02/11/25 1128    Lab Status: Final result Specimen: Swab from Nasopharynx Updated: 02/11/25 1346    Narrative:      The following orders were created for panel order COVID PRE-OP / PRE-PROCEDURE SCREENING ORDER (NO ISOLATION) - Swab, Nasopharynx.  Procedure                               Abnormality         Status                     ---------                               -----------         ------                     COVID-19, FLU A/B, RSV P...[[id number]]  Abnormal            Final result                 Please view results for these tests on the individual orders.    COVID-19, FLU A/B, RSV PCR 1 HR TAT - Swab, Nasopharynx [[id number]]  (Abnormal) Collected: 02/11/25 1128    Lab Status: Final result Specimen: Swab from Nasopharynx Updated: 02/11/25 1346     COVID19 Not Detected     Influenza A PCR Detected     Influenza B PCR Not Detected     RSV, PCR Not Detected    Narrative:      Fact sheet for providers: https://www.fda.gov/media/219470/download    Fact sheet for patients: https://www.fda.gov/media/672230/download    Test performed by PCR.            XR Chest 1  View    Result Date: 2/11/2025  XR CHEST 1 VW Date of Exam: 2/11/2025 11:18 AM EST Indication: productive cough, dementia with aspiration risk Comparison: AP chest 3/18/2025. Findings: Low volume inspiration. No acute airspace disease is seen. Heart size appears borderline prominent, favored to represent accentuated contour related to kyphotic positioning. No pleural effusion or pneumothorax or acute osseous abnormalities are identified. Upper lumbar levoscoliosis. Cholecystectomy.     Impression: Impression: No acute cardiopulmonary findings. Electronically Signed: Maria Del Carmen Zuniga MD  2/11/2025 11:59 AM EST  Workstation ID: [id number]         Assessment & Plan   Assessment & Plan       Influenza    Urinary tract infection, site not specified    Hypoxia    Brief Hospital Course to date  Ashley Younger is a 84 y.o. female with PMH significant for dementia, GERD, HLD, HTN presents to [facility] ED for complaint of hypoxia. Patient experiencing flu-like symptoms x 2 days, found to be Influenza A + on arrival. Discovered have UTI as well.     Influenza A + - POA  Hypoxia  Remote hx of COPD  - Patient began exhibiting flu-like symptoms 2 days ago, patient's daughter reports patient has hx of COPD. Patient was never prescribed supplemental O2 however, she reportedly shared supplemental O2 with her family member  - CXR in ED negative for acute process  - Patient is at an increased risk of serious complications from influenza due to her age (> 66 y/o) and PMH significant for dementia, will initiate on Tamiflu x 5 days  - Wean supplemental O2 as tolerated  - Supportive care    UTI - POA  - Remote hx of a procedure to reduce occurrence of UTI at [place] per patient's daughter  - U/A in ED + nitrites, trace leuks, 2+ blood, 6-10 WBCs, 4+ bacteria  - Urine cx pending  - Allergy to PCNs in chart, received Fosfomycin in ED    Dementia  Dysphagia  - Continue Aricept and melatonin  - Modified diet    HTN  - Continue amlodipine and  Lasix    HLD  - Continue statin    GERD  - Continue PPI    DVT prophylaxis:  Pharmacologic    CODE STATUS:    Medical Intervention Limits: No intubation (DNI)  Level Of Support Discussed With: Next of Kin (If No Surrogate)  Code Status (Patient has no pulse and is not breathing): No CPR (Do Not Attempt to Resuscitate)  Medical Interventions (Patient has pulse or is breathing): Limited Support    Expected Discharge  Expected Discharge Date: 2/13/2025; Expected Discharge Time:       This note has been completed as part of a split-shared workflow.     Signature: Electronically signed by Gabrielle Fishman PA-C, 02/11/25, 5:20 PM EST

## 2025-02-11 NOTE — ED NOTES
Ashley Younger    Nursing Report ED to Floor:  Mental status: Aox1, baseline  Ambulatory status: assist x2  Oxygen Therapy:  2lpm  Cardiac Rhythm: NSR  Admitted from: [facility]  Safety Concerns:  fall risk  Precautions: droplet- flu a  Social Issues: none  ED Room #:  14    ED Nurse Phone Extension - [phone] or may call [phone].      HPI:   Chief Complaint   Patient presents with    Flu Symptoms       Past Medical History:  Past Medical History:   Diagnosis Date    BENJIE (acute kidney injury)     Arthritis     Cholelithiasis     Chronic kidney disease, stage 3 unspecified     DJD (degenerative joint disease)     Encounter for removal of sutures     GERD (gastroesophageal reflux disease)     Headache     History of  brain aneurysm     Hyperlipidemia     Hypertension     Orthostatic hypotension     Osteoarthritis     Presence of neurostimulator     Recurrent falls     Renal insufficiency     Scalp laceration     Urinary tract infection         Past Surgical History:  Past Surgical History:   Procedure Laterality Date    CHOLECYSTECTOMY      CRANIAL NEUROSTIMULATOR INSERTION/REPLACEMENT      TUMOR    ERCP      WITH STONE REMOVED    ORIF WRIST FRACTURE Left         Admitting Doctor:   No admitting provider for patient encounter.    Consulting Provider(s):  Consults       No orders found from 1/13/2025 to 2/12/2025.             Admitting Diagnosis:   The primary encounter diagnosis was Hypoxia. Diagnoses of Influenza A and Acute UTI (urinary tract infection) were also pertinent to this visit.    Most Recent Vitals:   Vitals:    02/11/25 1500 02/11/25 1530 02/11/25 1600 02/11/25 1630   BP: 124/87 142/49 131/67 134/67   BP Location:       Patient Position:       Pulse: 82 85 77 89   Resp:       Temp:       TempSrc:       SpO2: 90%      Weight:       Height:           Active LDAs/IV Access:   Lines, Drains & Airways       Active LDAs       Name Placement date Placement time Site Days    Peripheral IV 02/11/25 1102 Right  Antecubital 02/11/25  1102  Antecubital  less than 1                    Labs (abnormal labs have a star):   Labs Reviewed   COVID-19/FLUA&B/RSV, NP SWAB IN TRANSPORT MEDIA 1 HR TAT - Abnormal; Notable for the following components:       Result Value    Influenza A PCR Detected (*)     All other components within normal limits    Narrative:     Fact sheet for providers: https://www.fda.gov/media/191623/download    Fact sheet for patients: https://www.fda.gov/media/788525/download    Test performed by PCR.   COMPREHENSIVE METABOLIC PANEL - Abnormal; Notable for the following components:    Glucose 104 (*)     CO2 30.0 (*)     Calcium 8.3 (*)     All other components within normal limits    Narrative:     GFR Categories in Chronic Kidney Disease (CKD)      GFR Category          GFR (mL/min/1.73)    Interpretation  G1                     90 or greater         Normal or high (1)  G2                      60-89                Mild decrease (1)  G3a                   45-59                Mild to moderate decrease  G3b                   30-44                Moderate to severe decrease  G4                    15-29                Severe decrease  G5                    14 or less           Kidney failure          (1)In the absence of evidence of kidney disease, neither GFR category G1 or G2 fulfill the criteria for CKD.    eGFR calculation 2021 CKD-EPI creatinine equation, which does not include race as a factor   URINALYSIS W/ MICROSCOPIC IF INDICATED (NO CULTURE) - Abnormal; Notable for the following components:    Ketones, UA 15 mg/dL (1+) (*)     Blood, UA Moderate (2+) (*)     Protein, UA Trace (*)     Leuk Esterase, UA Trace (*)     Nitrite, UA Positive (*)     All other components within normal limits   CBC WITH AUTO DIFFERENTIAL - Abnormal; Notable for the following components:    MCV 99.1 (*)     MCHC 31.3 (*)     Monocyte % 18.4 (*)     All other components within normal limits   URINALYSIS, MICROSCOPIC ONLY -  Abnormal; Notable for the following components:    WBC, UA 6-10 (*)     Bacteria, UA 4+ (*)     All other components within normal limits   LACTIC ACID, PLASMA - Normal   MAGNESIUM - Normal   COVID PRE-OP / PRE-PROCEDURE SCREENING ORDER (NO ISOLATION)    Narrative:     The following orders were created for panel order COVID PRE-OP / PRE-PROCEDURE SCREENING ORDER (NO ISOLATION) - Swab, Nasopharynx.  Procedure                               Abnormality         Status                     ---------                               -----------         ------                     COVID-19, FLU A/B, RSV P...[[id number]]  Abnormal            Final result                 Please view results for these tests on the individual orders.   CBC AND DIFFERENTIAL    Narrative:     The following orders were created for panel order CBC & Differential.  Procedure                               Abnormality         Status                     ---------                               -----------         ------                     CBC Auto Differential[[id number]]        Abnormal            Final result                 Please view results for these tests on the individual orders.       Meds Given in ED:   Medications   Pharmacy to Dose Oseltamivir (TAMIFLU) (has no administration in time range)   oseltamivir (TAMIFLU) capsule 30 mg (has no administration in time range)   amLODIPine (NORVASC) tablet 2.5 mg (has no administration in time range)   pantoprazole (PROTONIX) EC tablet 40 mg (has no administration in time range)   nitroglycerin (NITROSTAT) SL tablet 0.4 mg (has no administration in time range)   sodium chloride 0.9 % flush 10 mL (has no administration in time range)   sodium chloride 0.9 % flush 10 mL (has no administration in time range)   sodium chloride 0.9 % infusion 40 mL (has no administration in time range)   acetaminophen (TYLENOL) tablet 650 mg (has no administration in time range)     Or   acetaminophen (TYLENOL) 160 MG/5ML oral  solution 650 mg (has no administration in time range)     Or   acetaminophen (TYLENOL) suppository 650 mg (has no administration in time range)   sennosides-docusate (PERICOLACE) 8.6-50 MG per tablet 2 tablet (has no administration in time range)     And   polyethylene glycol (MIRALAX) packet 17 g (has no administration in time range)     And   bisacodyl (DULCOLAX) EC tablet 5 mg (has no administration in time range)     And   bisacodyl (DULCOLAX) suppository 10 mg (has no administration in time range)   Potassium Replacement - Follow Nurse / BPA Driven Protocol (has no administration in time range)   Magnesium Standard Dose Replacement - Follow Nurse / BPA Driven Protocol (has no administration in time range)   Phosphorus Replacement - Follow Nurse / BPA Driven Protocol (has no administration in time range)   Calcium Replacement - Follow Nurse / BPA Driven Protocol (has no administration in time range)   atorvastatin (LIPITOR) tablet 20 mg (has no administration in time range)   donepezil (ARICEPT) tablet 5 mg (has no administration in time range)   heparin (porcine) 5000 UNIT/ML injection 5,000 Units (has no administration in time range)   furosemide (LASIX) tablet 20 mg (has no administration in time range)   melatonin tablet 5 mg (has no administration in time range)   guaiFENesin (MUCINEX) 12 hr tablet 600 mg (has no administration in time range)   phenol (CHLORASEPTIC) 1.4 % liquid 1 spray (has no administration in time range)   fosfomycin (MONUROL) packet 3 g (3 g Oral Given 2/11/25 1551)   ipratropium-albuterol (DUO-NEB) nebulizer solution 3 mL (3 mL Nebulization Given 2/11/25 1600)     Pharmacy to Dose Oseltamivir (TAMIFLU),          Last NIH score:                                                          Dysphagia screening results:        [place] Coma Scale:  No data recorded     CIWA:        Restraint Type:            Isolation Status:  Droplet

## 2025-02-12 ENCOUNTER — APPOINTMENT (OUTPATIENT)
Dept: GENERAL RADIOLOGY | Facility: HOSPITAL | Age: 85
DRG: 193 | End: 2025-02-12
Payer: MEDICARE

## 2025-02-12 LAB
ALBUMIN SERPL-MCNC: 3.8 G/DL (ref 3.5–5.2)
ALBUMIN/GLOB SERPL: 1.4 G/DL
ALP SERPL-CCNC: 72 U/L (ref 39–117)
ALT SERPL W P-5'-P-CCNC: 12 U/L (ref 1–33)
ANION GAP SERPL CALCULATED.3IONS-SCNC: 11 MMOL/L (ref 5–15)
AST SERPL-CCNC: 30 U/L (ref 1–32)
BACTERIA UR QL AUTO: ABNORMAL /HPF
BASOPHILS # BLD AUTO: 0.02 10*3/MM3 (ref 0–0.2)
BASOPHILS NFR BLD AUTO: 0.3 % (ref 0–1.5)
BILIRUB SERPL-MCNC: 0.5 MG/DL (ref 0–1.2)
BILIRUB UR QL STRIP: ABNORMAL
BUN SERPL-MCNC: 21 MG/DL (ref 8–23)
BUN/CREAT SERPL: 22.3 (ref 7–25)
CALCIUM SPEC-SCNC: 8.2 MG/DL (ref 8.6–10.5)
CHLORIDE SERPL-SCNC: 100 MMOL/L (ref 98–107)
CLARITY UR: ABNORMAL
CO2 SERPL-SCNC: 31 MMOL/L (ref 22–29)
COLOR UR: ABNORMAL
CREAT SERPL-MCNC: 0.94 MG/DL (ref 0.57–1)
DEPRECATED RDW RBC AUTO: 53 FL (ref 37–54)
EGFRCR SERPLBLD CKD-EPI 2021: 60.7 ML/MIN/1.73
EOSINOPHIL # BLD AUTO: 0.03 10*3/MM3 (ref 0–0.4)
EOSINOPHIL NFR BLD AUTO: 0.5 % (ref 0.3–6.2)
ERYTHROCYTE [DISTWIDTH] IN BLOOD BY AUTOMATED COUNT: 14.3 % (ref 12.3–15.4)
GLOBULIN UR ELPH-MCNC: 2.7 GM/DL
GLUCOSE SERPL-MCNC: 89 MG/DL (ref 65–99)
GLUCOSE UR STRIP-MCNC: NEGATIVE MG/DL
GRAN CASTS URNS QL MICRO: ABNORMAL /LPF
HCT VFR BLD AUTO: 43.4 % (ref 34–46.6)
HGB BLD-MCNC: 13.5 G/DL (ref 12–15.9)
HGB UR QL STRIP.AUTO: ABNORMAL
HYALINE CASTS UR QL AUTO: ABNORMAL /LPF
IMM GRANULOCYTES # BLD AUTO: 0.02 10*3/MM3 (ref 0–0.05)
IMM GRANULOCYTES NFR BLD AUTO: 0.3 % (ref 0–0.5)
KETONES UR QL STRIP: ABNORMAL
LEUKOCYTE ESTERASE UR QL STRIP.AUTO: ABNORMAL
LYMPHOCYTES # BLD AUTO: 1.5 10*3/MM3 (ref 0.7–3.1)
LYMPHOCYTES NFR BLD AUTO: 23.6 % (ref 19.6–45.3)
MAGNESIUM SERPL-MCNC: 1.9 MG/DL (ref 1.6–2.4)
MCH RBC QN AUTO: 31 PG (ref 26.6–33)
MCHC RBC AUTO-ENTMCNC: 31.1 G/DL (ref 31.5–35.7)
MCV RBC AUTO: 99.5 FL (ref 79–97)
MONOCYTES # BLD AUTO: 0.99 10*3/MM3 (ref 0.1–0.9)
MONOCYTES NFR BLD AUTO: 15.6 % (ref 5–12)
NEUTROPHILS NFR BLD AUTO: 3.79 10*3/MM3 (ref 1.7–7)
NEUTROPHILS NFR BLD AUTO: 59.7 % (ref 42.7–76)
NITRITE UR QL STRIP: POSITIVE
NRBC BLD AUTO-RTO: 0 /100 WBC (ref 0–0.2)
PH UR STRIP.AUTO: 5.5 [PH] (ref 5–8)
PHOSPHATE SERPL-MCNC: 3.8 MG/DL (ref 2.5–4.5)
PLATELET # BLD AUTO: 165 10*3/MM3 (ref 140–450)
PMV BLD AUTO: 11.1 FL (ref 6–12)
POTASSIUM SERPL-SCNC: 4 MMOL/L (ref 3.5–5.2)
PROT SERPL-MCNC: 6.5 G/DL (ref 6–8.5)
PROT UR QL STRIP: ABNORMAL
QT INTERVAL: 364 MS
QTC INTERVAL: 419 MS
RBC # BLD AUTO: 4.36 10*6/MM3 (ref 3.77–5.28)
RBC # UR STRIP: ABNORMAL /HPF
REF LAB TEST METHOD: ABNORMAL
SODIUM SERPL-SCNC: 142 MMOL/L (ref 136–145)
SP GR UR STRIP: 1.02 (ref 1–1.03)
SQUAMOUS #/AREA URNS HPF: ABNORMAL /HPF
UROBILINOGEN UR QL STRIP: ABNORMAL
WBC # UR STRIP: ABNORMAL /HPF
WBC NRBC COR # BLD AUTO: 6.35 10*3/MM3 (ref 3.4–10.8)
YEAST URNS QL MICRO: ABNORMAL /HPF

## 2025-02-12 PROCEDURE — 81001 URINALYSIS AUTO W/SCOPE: CPT | Performed by: INTERNAL MEDICINE

## 2025-02-12 PROCEDURE — 74230 X-RAY XM SWLNG FUNCJ C+: CPT

## 2025-02-12 PROCEDURE — 97166 OT EVAL MOD COMPLEX 45 MIN: CPT

## 2025-02-12 PROCEDURE — 71045 X-RAY EXAM CHEST 1 VIEW: CPT

## 2025-02-12 PROCEDURE — 84100 ASSAY OF PHOSPHORUS: CPT

## 2025-02-12 PROCEDURE — 92610 EVALUATE SWALLOWING FUNCTION: CPT

## 2025-02-12 PROCEDURE — 25010000002 HEPARIN (PORCINE) PER 1000 UNITS

## 2025-02-12 PROCEDURE — 80053 COMPREHEN METABOLIC PANEL: CPT

## 2025-02-12 PROCEDURE — 99232 SBSQ HOSP IP/OBS MODERATE 35: CPT | Performed by: INTERNAL MEDICINE

## 2025-02-12 PROCEDURE — 92611 MOTION FLUOROSCOPY/SWALLOW: CPT

## 2025-02-12 PROCEDURE — 94799 UNLISTED PULMONARY SVC/PX: CPT

## 2025-02-12 PROCEDURE — 97161 PT EVAL LOW COMPLEX 20 MIN: CPT

## 2025-02-12 PROCEDURE — 83735 ASSAY OF MAGNESIUM: CPT

## 2025-02-12 PROCEDURE — 85025 COMPLETE CBC W/AUTO DIFF WBC: CPT

## 2025-02-12 PROCEDURE — 87086 URINE CULTURE/COLONY COUNT: CPT | Performed by: INTERNAL MEDICINE

## 2025-02-12 RX ADMIN — HEPARIN SODIUM 5000 UNITS: 5000 INJECTION INTRAVENOUS; SUBCUTANEOUS at 19:21

## 2025-02-12 RX ADMIN — FUROSEMIDE 20 MG: 20 TABLET ORAL at 11:39

## 2025-02-12 RX ADMIN — OSELTAMIVIR PHOSPHATE 30 MG: 30 CAPSULE ORAL at 19:20

## 2025-02-12 RX ADMIN — HEPARIN SODIUM 5000 UNITS: 5000 INJECTION INTRAVENOUS; SUBCUTANEOUS at 11:39

## 2025-02-12 RX ADMIN — GUAIFENESIN 600 MG: 600 TABLET ORAL at 11:39

## 2025-02-12 RX ADMIN — GUAIFENESIN 600 MG: 600 TABLET ORAL at 19:20

## 2025-02-12 RX ADMIN — Medication 10 ML: at 09:06

## 2025-02-12 RX ADMIN — DONEPEZIL HYDROCHLORIDE 5 MG: 5 TABLET ORAL at 19:19

## 2025-02-12 RX ADMIN — BARIUM SULFATE 100 ML: 0.81 POWDER, FOR SUSPENSION ORAL at 14:28

## 2025-02-12 RX ADMIN — Medication 5 MG: at 19:20

## 2025-02-12 RX ADMIN — AMLODIPINE BESYLATE 2.5 MG: 2.5 TABLET ORAL at 11:39

## 2025-02-12 RX ADMIN — BARIUM SULFATE 10 ML: 400 SUSPENSION ORAL at 14:28

## 2025-02-12 RX ADMIN — Medication 10 ML: at 19:21

## 2025-02-12 RX ADMIN — PHENOL 1 SPRAY: 1.5 LIQUID ORAL at 19:22

## 2025-02-12 RX ADMIN — ATORVASTATIN CALCIUM 20 MG: 20 TABLET, FILM COATED ORAL at 19:19

## 2025-02-12 RX ADMIN — BARIUM SULFATE 20 ML: 400 PASTE ORAL at 14:28

## 2025-02-12 RX ADMIN — OSELTAMIVIR PHOSPHATE 30 MG: 30 CAPSULE ORAL at 13:53

## 2025-02-12 RX ADMIN — PANTOPRAZOLE SODIUM 40 MG: 40 TABLET, DELAYED RELEASE ORAL at 11:39

## 2025-02-12 RX ADMIN — BARIUM SULFATE 50 ML: 400 SUSPENSION ORAL at 14:28

## 2025-02-12 RX ADMIN — AMLODIPINE BESYLATE 2.5 MG: 2.5 TABLET ORAL at 19:20

## 2025-02-12 NOTE — PLAN OF CARE
Goal Outcome Evaluation:                   Anticipated Discharge Disposition (SLP): skilled nursing facility          SLP Swallowing Diagnosis: moderate, oral dysphagia, mod-severe, pharyngeal dysphagia (02/12/25 7255)

## 2025-02-12 NOTE — THERAPY EVALUATION
Patient Name: Ashley Younger  : 1940    MRN: [id number]                              Today's Date: 2025       Admit Date: 2025    Visit Dx:     ICD-10-CM ICD-9-CM   1. Hypoxia  R09.02 799.02   2. Influenza A  J10.1 487.1   3. Acute UTI (urinary tract infection)  N39.0 599.0   4. Dysphagia, unspecified type  R13.10 787.20     Patient Active Problem List   Diagnosis    Common bile duct (CBD) obstruction    Hypertension    Sepsis    Fracture of left shoulder girdle, part unspecified, subsequent encounter for fracture with routine healing    Muscle weakness (generalized)    Urinary tract infection, site not specified    Wedge compression fracture of t5-T6 vertebra, subsequent encounter for fracture with routine healing    Encephalopathy, unspecified    Fall at home    Dehydration    Cholangitis    Influenza    Hypoxia     Past Medical History:   Diagnosis Date    BENJIE (acute kidney injury)     Arthritis     Cholelithiasis     Chronic kidney disease, stage 3 unspecified     DJD (degenerative joint disease)     Encounter for removal of sutures     GERD (gastroesophageal reflux disease)     Headache     History of  brain aneurysm     Hyperlipidemia     Hypertension     Orthostatic hypotension     Osteoarthritis     Presence of neurostimulator     Recurrent falls     Renal insufficiency     Scalp laceration     Urinary tract infection      Past Surgical History:   Procedure Laterality Date    CHOLECYSTECTOMY      CRANIAL NEUROSTIMULATOR INSERTION/REPLACEMENT      TUMOR    ERCP      WITH STONE REMOVED    ORIF WRIST FRACTURE Left       General Information       Row Name 25 1157          Physical Therapy Time and Intention    Document Type evaluation  -SS     Mode of Treatment physical therapy;co-treatment  -SS       Row Name 25 1157          General Information    Patient Profile Reviewed yes  -SS     Prior Level of Function --  may need further clarification; pt. reports use of WC and walker,  also reports she lives at home with kids; per chart review pt. a resident at [facility]  -     Existing Precautions/Restrictions fall;oxygen therapy device and L/min  dementia, incontinence  -     Barriers to Rehab cognitive status  -       Row Name 02/12/25 1157          Living Environment    People in Home facility resident  -       Row Name 02/12/25 1157          Home Main Entrance    Number of Stairs, Main Entrance none  -       Row Name 02/12/25 1157          Stairs Within Home, Primary    Number of Stairs, Within Home, Primary none  -       Row Name 02/12/25 1157          Cognition    Orientation Status (Cognition) oriented to;person;verbal cues/prompts needed for orientation;place  -       Row Name 02/12/25 1157          Safety Issues/Impairments Affecting Functional Mobility    Safety Issues Affecting Function (Mobility) awareness of need for assistance;insight into deficits/self-awareness;judgment;positioning of assistive device;problem-solving;safety precaution awareness;safety precautions follow-through/compliance;sequencing abilities  -     Impairments Affecting Function (Mobility) cognition;balance;endurance/activity tolerance;postural/trunk control;strength;range of motion (ROM)  -     Cognitive Impairments, Mobility Safety/Performance attention;awareness, need for assistance;insight into deficits/self-awareness;judgment;problem-solving/reasoning;safety precaution awareness;safety precaution follow-through;sequencing abilities  -               User Key  (r) = Recorded By, (t) = Taken By, (c) = Cosigned By      Initials Name Provider Type     Renetta Quiñonez, PT Physical Therapist                   Mobility       Row Name 02/12/25 1159          Bed Mobility    Bed Mobility supine-sit;scooting/bridging  -     Scooting/Bridging [place] (Bed Mobility) maximum assist (25% patient effort);2 person assist;verbal cues  -     Supine-Sit [place] (Bed Mobility) maximum assist  (25% patient effort);2 person assist;verbal cues  -     Assistive Device (Bed Mobility) repositioning sheet;head of bed elevated  -     Comment, (Bed Mobility) VC for sequencing; pt. makes good initiation  -       Row Name 02/12/25 1159          Bed-Chair Transfer    Bed-Chair [place] (Transfers) maximum assist (25% patient effort);2 person assist;verbal cues  -SS     Assistive Device (Bed-Chair Transfers) other (see comments)  BUE support  -     Comment, (Bed-Chair Transfer) VC for sequencing; pt. demonstrates BLE extension during transfer and attempting to step over PT foot  -       Row Name 02/12/25 1159          Sit-Stand Transfer    Sit-Stand [place] (Transfers) maximum assist (25% patient effort);2 person assist;verbal cues  -     Assistive Device (Sit-Stand Transfers) walker, front-wheeled  -     Comment, (Sit-Stand Transfer) VC for LE set up, hand placement; performed 2x w/FWW and pt. demonstrates BLE extension w/request to return to sitting; removed FWW for pt. safety and provided BUE support  -       Row Name 02/12/25 1159          Gait/Stairs (Locomotion)    [place] Level (Gait) not tested  -     Comment, (Gait/Stairs) not safe at this time to attempt  -               User Key  (r) = Recorded By, (t) = Taken By, (c) = Cosigned By      Initials Name Provider Type     Renetta Quiñonez PT Physical Therapist                   Obj/Interventions       Row Name 02/12/25 1202          Range of Motion Comprehensive    General Range of Motion bilateral lower extremity ROM WFL  -       Row Name 02/12/25 1202          Strength Comprehensive (MMT)    Comment, General Manual Muscle Testing (MMT) Assessment BLE gross 3+/5 per SLR test  -       Row Name 02/12/25 1202          Motor Skills    Motor Skills coordination  -     Coordination bilateral;lower extremity;moderate impairment  -       Row Name 02/12/25 1202          Balance    Balance Assessment sitting static  balance;sitting dynamic balance;standing static balance;standing dynamic balance  -SS     Static Sitting Balance contact guard  -SS     Dynamic Sitting Balance minimal assist  -SS     Position, Sitting Balance unsupported;sitting edge of bed  -SS     Static Standing Balance maximum assist;2-person assist  -SS     Dynamic Standing Balance maximum assist;2-person assist  -SS     Position/Device Used, Standing Balance supported;walker, front-wheeled  -SS     Balance Interventions sitting;standing;sit to stand;supported;static;dynamic  -SS       Row Name 02/12/25 1202          Sensory Assessment (Somatosensory)    Sensory Assessment (Somatosensory) LE sensation intact  -               User Key  (r) = Recorded By, (t) = Taken By, (c) = Cosigned By      Initials Name Provider Type    SS Renetta Quiñonez, PT Physical Therapist                   Goals/Plan       Row Name 02/12/25 1348          Bed Mobility Goal 1 (PT)    Activity/Assistive Device (Bed Mobility Goal 1, PT) bed mobility activities, all  -SS     [place] Level/Cues Needed (Bed Mobility Goal 1, PT) moderate assist (50-74% patient effort)  -SS     Time Frame (Bed Mobility Goal 1, PT) short term goal (STG);5 days  -SS       Row Name 02/12/25 1348          Transfer Goal 1 (PT)    Activity/Assistive Device (Transfer Goal 1, PT) sit-to-stand/stand-to-sit;bed-to-chair/chair-to-bed;walker, rolling  -SS     [place] Level/Cues Needed (Transfer Goal 1, PT) moderate assist (50-74% patient effort)  -SS     Time Frame (Transfer Goal 1, PT) long term goal (LTG);10 days  -       Row Name 02/12/25 1347          Gait Training Goal 1 (PT)    Activity/Assistive Device (Gait Training Goal 1, PT) gait (walking locomotion);assistive device use;walker, rolling  -SS     [place] Level (Gait Training Goal 1, PT) minimum assist (75% or more patient effort)  -SS     Distance (Gait Training Goal 1, PT) 10  -SS     Time Frame (Gait Training Goal 1, PT) long term goal  (LTG);10 days  -       Row Name 02/12/25 1348          Therapy Assessment/Plan (PT)    Planned Therapy Interventions (PT) balance training;bed mobility training;gait training;home exercise program;neuromuscular re-education;patient/family education;postural re-education;strengthening;stretching;transfer training;wheelchair management/propulsion training  -               User Key  (r) = Recorded By, (t) = Taken By, (c) = Cosigned By      Initials Name Provider Type     Renetta Quiñonez, PT Physical Therapist                   Clinical Impression       Row Name 02/12/25 1203          Pain    Pretreatment Pain Rating 0/10 - no pain  -     Posttreatment Pain Rating 0/10 - no pain  -       Row Name 02/12/25 1203          Plan of Care Review    Plan of Care Reviewed With patient  -     Outcome Evaluation Pt. presents below baseline function w/generalized weakness affecting her ability to safely participate in functional mobility. She performed bed mobility and transfers w/max assist of 2. Activity limited by instability. Pt. would benefit from acute PT services to address stated deficits.  -[facility] Name 02/12/25 1203          Therapy Assessment/Plan (PT)    Patient/Family Therapy Goals Statement (PT) to return to [facility]  -     Rehab Potential (PT) good  -     Criteria for Skilled Interventions Met (PT) yes;meets criteria;skilled treatment is necessary  -     Therapy Frequency (PT) daily  -     Predicted Duration of Therapy Intervention (PT) 10 days  -       Row Name 02/12/25 1203          Vital Signs    Pre Systolic BP Rehab 121  -SS     Pre Treatment Diastolic BP 61  -SS     Post Systolic BP Rehab 103  pt. asymptomatic throughout mobility  -SS     Post Treatment Diastolic BP 65  -SS     Pretreatment Heart Rate (beats/min) 86  -SS     Posttreatment Heart Rate (beats/min) 83  -SS     Pre SpO2 (%) 94  -SS     O2 Delivery Pre Treatment nasal cannula  6L  -SS     Post SpO2 (%) 94  -SS     O2 Delivery  Post Treatment nasal cannula  6L  -SS     Pre Patient Position Supine  -SS     Post Patient Position Sitting  -SS       Row Name 02/12/25 1203          Positioning and Restraints    Pre-Treatment Position in bed  -SS     Post Treatment Position chair  -SS     In Chair notified nsg;reclined;call light within reach;encouraged to call for assist;exit alarm on;on mechanical lift sling;waffle cushion;legs elevated  -SS               User Key  (r) = Recorded By, (t) = Taken By, (c) = Cosigned By      Initials Name Provider Type     Renetta Quiñonez, PT Physical Therapist                   Outcome Measures       Row Name 02/12/25 1349 02/12/25 0805       How much help from another person do you currently need...    Turning from your back to your side while in flat bed without using bedrails? 2  -SS 2  -MD    Moving from lying on back to sitting on the side of a flat bed without bedrails? 2  -SS 2  -MD    Moving to and from a bed to a chair (including a wheelchair)? 2  -SS 1  -MD    Standing up from a chair using your arms (e.g., wheelchair, bedside chair)? 2  -SS 1  -MD    Climbing 3-5 steps with a railing? 1  -SS 1  -MD    To walk in hospital room? 1  -SS 1  -MD    AM-PAC 6 Clicks Score (PT) 10  -SS 8  -MD    Highest Level of Mobility Goal 4 --> Transfer to chair/commode  -SS 3 --> Sit at edge of bed  -MD      Row Name 02/12/25 1349 02/12/25 1003       Functional Assessment    Outcome Measure Options AM-PAC 6 Clicks Basic Mobility (PT)  -SS AM-PAC 6 Clicks Daily Activity (OT)  -TB              User Key  (r) = Recorded By, (t) = Taken By, (c) = Cosigned By      Initials Name Provider Type    Marti Cotto OT Occupational Therapist    Renetta Ruiz, PT Physical Therapist    Ursula Jacobson, RN Registered Nurse                                 Physical Therapy Education       Title: PT OT SLP Therapies (In Progress)       Topic: Physical Therapy (In Progress)       Point: Mobility training (Done)        Learning Progress Summary            Patient TIANNA Starks, VU,DU,NR by  at 2/12/2025 1350    Comment: Educated pt. safety/technique w/bed mobility, transfers, PT POC                      Point: Home exercise program (Not Started)       Learner Progress:  Not documented in this visit.              Point: Body mechanics (Done)       Learning Progress Summary            Patient Raudel, TIANNA, VU,DU,NR by  at 2/12/2025 1350    Comment: Educated pt. safety/technique w/bed mobility, transfers, PT POC                      Point: Precautions (Done)       Learning Progress Summary            Patient TIANNA Starks, VU,DU,NR by  at 2/12/2025 1350    Comment: Educated pt. safety/technique w/bed mobility, transfers, PT POC                                      User Key       Initials Effective Dates Name Provider Type Discipline     06/01/21 -  Renetta Quiñonez, PT Physical Therapist PT                  PT Recommendation and Plan  Planned Therapy Interventions (PT): balance training, bed mobility training, gait training, home exercise program, neuromuscular re-education, patient/family education, postural re-education, strengthening, stretching, transfer training, wheelchair management/propulsion training  Outcome Evaluation: Pt. presents below baseline function w/generalized weakness affecting her ability to safely participate in functional mobility. She performed bed mobility and transfers w/max assist of 2. Activity limited by instability. Pt. would benefit from acute PT services to address stated deficits.     Time Calculation:   PT Evaluation Complexity  History, PT Evaluation Complexity: 3 or more personal factors and/or comorbidities  Examination of Body Systems (PT Eval Complexity): total of 4 or more elements  Clinical Presentation (PT Evaluation Complexity): evolving  Clinical Decision Making (PT Evaluation Complexity): low complexity  Overall Complexity (PT Evaluation Complexity): low complexity     PT Charges       Row Name  02/12/25 1351             Time Calculation    Start Time 0913  -SS      PT Received On 02/12/25  -SS      PT Goal Re-Cert Due Date 02/22/25  -SS         Untimed Charges    PT Eval/Re-eval Minutes 48  -SS         Total Minutes    Untimed Charges Total Minutes 48  -SS       Total Minutes 48  -SS                User Key  (r) = Recorded By, (t) = Taken By, (c) = Cosigned By      Initials Name Provider Type    SS Renetta Quiñonez, PT Physical Therapist                  Therapy Charges for Today       Code Description Service Date Service Provider Modifiers Qty    [id number] HC PT EVAL LOW COMPLEXITY 4 2/12/2025 Renetta Quiñonez, PT  1            PT G-Codes  Outcome Measure Options: AM-PAC 6 Clicks Basic Mobility (PT)  AM-PAC 6 Clicks Score (PT): 10  AM-PAC 6 Clicks Score (OT): 9  PT Discharge Summary  Anticipated Discharge Disposition (PT): home with 24/7 care, home with home health (return to previous residence)    Renetta Quiñonez PT  2/12/2025

## 2025-02-12 NOTE — PLAN OF CARE
Problem: Adult Inpatient Plan of Care  Goal: Plan of Care Review  Outcome: Progressing  Goal: Patient-Specific Goal (Individualized)  Outcome: Progressing  Goal: Absence of Hospital-Acquired Illness or Injury  Outcome: Progressing  Intervention: Identify and Manage Fall Risk  Intervention: Prevent Skin Injury  Intervention: Prevent and Manage VTE (Venous Thromboembolism) Risk  Intervention: Prevent Infection  Goal: Optimal Comfort and Wellbeing  Outcome: Progressing  Intervention: Provide Person-Centered Care  Goal: Readiness for Transition of Care  Outcome: Progressing     Problem: Violence Risk or Actual  Goal: Anger and Impulse Control  Outcome: Progressing  Intervention: Minimize Safety Risk     Problem: Fall Injury Risk  Goal: Absence of Fall and Fall-Related Injury  Outcome: Progressing  Intervention: Identify and Manage Contributors  Intervention: Promote Injury-Free Environment     Problem: Skin Injury Risk Increased  Goal: Skin Health and Integrity  Outcome: Progressing  Intervention: Optimize Skin Protection     Problem: Comorbidity Management  Goal: Maintenance of COPD Symptom Control  Outcome: Progressing  Intervention: Maintain COPD (Chronic Obstructive Pulmonary Disease) Symptom Control  Goal: Maintenance of Osteoarthritis Symptom Control  Outcome: Progressing  Intervention: Maintain Osteoarthritis Symptom Control     Problem: Pulmonary Impairment  Goal: Improved Activity Tolerance  Outcome: Progressing  Goal: Effective Airway Clearance  Outcome: Progressing  Goal: Optimal Gas Exchange  Outcome: Progressing   Goal Outcome Evaluation:      Pt alert to self only. Pt remains on 6L humidified nasal cannula to maintain spo2. MBS revealed moderate dysphagia, see new diet orders. Droplet precautions maintained. Skin interventions in place. Turned q2. Plan of care ongoing.

## 2025-02-12 NOTE — THERAPY EVALUATION
Patient Name: Ashley Younger  : 1940    MRN: [id number]                              Today's Date: 2025       Admit Date: 2025    Visit Dx:     ICD-10-CM ICD-9-CM   1. Hypoxia  R09.02 799.02   2. Influenza A  J10.1 487.1   3. Acute UTI (urinary tract infection)  N39.0 599.0     Patient Active Problem List   Diagnosis    Common bile duct (CBD) obstruction    Hypertension    Sepsis    Fracture of left shoulder girdle, part unspecified, subsequent encounter for fracture with routine healing    Muscle weakness (generalized)    Urinary tract infection, site not specified    Wedge compression fracture of t5-T6 vertebra, subsequent encounter for fracture with routine healing    Encephalopathy, unspecified    Fall at home    Dehydration    Cholangitis    Influenza    Hypoxia     Past Medical History:   Diagnosis Date    BENJIE (acute kidney injury)     Arthritis     Cholelithiasis     Chronic kidney disease, stage 3 unspecified     DJD (degenerative joint disease)     Encounter for removal of sutures     GERD (gastroesophageal reflux disease)     Headache     History of  brain aneurysm     Hyperlipidemia     Hypertension     Orthostatic hypotension     Osteoarthritis     Presence of neurostimulator     Recurrent falls     Renal insufficiency     Scalp laceration     Urinary tract infection      Past Surgical History:   Procedure Laterality Date    CHOLECYSTECTOMY      CRANIAL NEUROSTIMULATOR INSERTION/REPLACEMENT      TUMOR    ERCP      WITH STONE REMOVED    ORIF WRIST FRACTURE Left       General Information       Row Name 25 0943          OT Time and Intention    Subjective Information no complaints  -TB     Document Type evaluation  -TB     Mode of Treatment occupational therapy  -TB     Patient Effort fair  -TB     Symptoms Noted During/After Treatment significant change in vital signs  -TB     Comment BP dropped from 121/61 to 103/65 with EOB/OOB activity  -TB       Row Name 25 0943           General Information    Patient Profile Reviewed yes  -TB     Prior Level of Function max assist:;transfer;bed mobility;bathing;dressing  [facility]  -TB     Existing Precautions/Restrictions fall;oxygen therapy device and L/min  chronic O2, dementia, incontinent  -TB     Barriers to Rehab cognitive status  -TB       Row Name 02/12/25 0943          Living Environment    People in Home facility resident  -TB       Row Name 02/12/25 0943          Home Main Entrance    Number of Stairs, Main Entrance none  -TB       Row Name 02/12/25 0943          Stairs Within Home, Primary    Number of Stairs, Within Home, Primary none  -TB       Row Name 02/12/25 0943          Cognition    Orientation Status (Cognition) oriented to;person  -TB       Row Name 02/12/25 0943          Safety Issues/Impairments Affecting Functional Mobility    Safety Issues Affecting Function (Mobility) insight into deficits/self-awareness;awareness of need for assistance;problem-solving;safety precaution awareness;safety precautions follow-through/compliance;sequencing abilities  -TB     Impairments Affecting Function (Mobility) cognition;balance;endurance/activity tolerance;postural/trunk control;strength;range of motion (ROM)  -TB     Cognitive Impairments, Mobility Safety/Performance attention;awareness, need for assistance;insight into deficits/self-awareness;problem-solving/reasoning;safety precaution awareness;safety precaution follow-through;sequencing abilities  -TB     Comment, Safety Issues/Impairments (Mobility) Max Ax2 Std pvt transfer  -TB               User Key  (r) = Recorded By, (t) = Taken By, (c) = Cosigned By      Initials Name Provider Type    TB Marti Ventura OT Occupational Therapist                     Mobility/ADL's       Row Name 02/12/25 0966          Bed Mobility    Bed Mobility supine-sit;scooting/bridging  -TB     Scooting/Bridging [place] (Bed Mobility) maximum assist (25% patient effort);2 person  assist;verbal cues  -TB     Supine-Sit [place] (Bed Mobility) maximum assist (25% patient effort);2 person assist;verbal cues  -TB     Bed Mobility, Safety Issues decreased use of arms for pushing/pulling;decreased use of legs for bridging/pushing;impaired trunk control for bed mobility  -TB     Assistive Device (Bed Mobility) repositioning sheet  -TB       Row Name 02/12/25 0948          Transfers    Transfers sit-stand transfer;stand-sit transfer;bed-chair transfer  -TB     Comment, (Transfers) Education, cues, and assist for hand placement and safety.  -TB       Row Name 02/12/25 0948          Bed-Chair Transfer    Bed-Chair [place] (Transfers) maximum assist (25% patient effort);2 person assist;verbal cues  -TB     Comment, (Bed-Chair Transfer) BUE support, Std pvt transfer  -TB       Row Name 02/12/25 0948          Sit-Stand Transfer    Sit-Stand [place] (Transfers) maximum assist (25% patient effort);2 person assist;verbal cues  -TB     Assistive Device (Sit-Stand Transfers) walker, front-wheeled  -TB     Comment, (Sit-Stand Transfer) Attempts x2 to stand EOB with RW. RW removed and pt stands with BUE support and B feet/knees blocked.  -TB       Row Name 02/12/25 0948          Stand-Sit Transfer    Stand-Sit [place] (Transfers) maximum assist (25% patient effort);2 person assist;verbal cues  -TB     Comment, (Stand-Sit Transfer) Assist to lower into chair  -TB       Row Name 02/12/25 0948          Functional Mobility    Functional Mobility- Ind. Level not tested;unable to perform  -TB     Patient was able to Ambulate no, other medical factors prevent ambulation  -TB     Reason Patient was unable to Ambulate Non-Ambulatory at Baseline  -TB       Row Name 02/12/25 0948          Activities of Daily Living    BADL Assessment/Intervention lower body dressing;grooming;toileting  -TB       Row Name 02/12/25 0948          Lower Body Dressing Assessment/Training    [place] Level (Lower Body  Dressing) don;socks;dependent (less than 25% patient effort)  -TB       Row Name 02/12/25 0948          Grooming Assessment/Training    [place] Level (Grooming) set up;wash face, hands;moderate assist (50% patient effort);hair care, combing/brushing  -TB     Position (Grooming) supported sitting  -TB     Comment, (Grooming) OT assisted to detangle pt's hair  -TB       Row Name 02/12/25 0948          Toileting Assessment/Training    [place] Level (Toileting) toileting skills;dependent (less than 25% patient effort)  -TB     Comment, (Toileting) Incontinent  -TB               User Key  (r) = Recorded By, (t) = Taken By, (c) = Cosigned By      Initials Name Provider Type    TB Marti Ventura, OT Occupational Therapist                   Obj/Interventions       Row Name 02/12/25 0951          Sensory Assessment (Somatosensory)    Sensory Assessment (Somatosensory) unable/difficult to assess  -TB     Sensory Assessment Pt responds to light touch BUE/hands.  -TB       Row Name 02/12/25 0951          Vision Assessment/Intervention    Visual Impairment/Limitations unable/difficult to assess  -TB     Vision Assessment Comment Pt opens eyes on command and to her name. Keeps eyes closed most of assessment.  -TB       Row Name 02/12/25 0951          Range of Motion Comprehensive    General Range of Motion bilateral upper extremity ROM WFL  -TB     Comment, General Range of Motion BUE AAROM WFL  -TB       Row Name 02/12/25 0951          Strength Comprehensive (MMT)    Comment, General Manual Muscle Testing (MMT) Assessment Generalized weakness/deconditioned. Unable to follow commands for formal MMT. B  WNL, 4+/5.  -TB       Row Name 02/12/25 0951          Balance    Balance Assessment sitting static balance;sitting dynamic balance;sit to stand dynamic balance;standing static balance;standing dynamic balance  -TB     Static Sitting Balance contact guard  -TB     Dynamic Sitting Balance minimal assist  -TB      Position, Sitting Balance supported;sitting in chair;sitting edge of bed  -TB     Sit to Stand Dynamic Balance maximum assist;2-person assist;verbal cues  -TB     Static Standing Balance maximum assist;2-person assist;verbal cues  -TB     Dynamic Standing Balance maximum assist;2-person assist;verbal cues  -TB     Position/Device Used, Standing Balance supported  -TB     Balance Interventions sitting;standing;sit to stand;supported;static;dynamic;dynamic reaching;occupation based/functional task;UE activity with balance activity  -TB               User Key  (r) = Recorded By, (t) = Taken By, (c) = Cosigned By      Initials Name Provider Type    TB Marti Ventura, OT Occupational Therapist                   Goals/Plan       Row Name 02/12/25 1000          Transfer Goal 1 (OT)    Activity/Assistive Device (Transfer Goal 1, OT) commode, bedside without drop arms  -TB     [place] Level/Cues Needed (Transfer Goal 1, OT) maximum assist (25-49% patient effort);verbal cues required  -TB     Time Frame (Transfer Goal 1, OT) long term goal (LTG);1 week  -TB     Progress/Outcome (Transfer Goal 1, OT) goal ongoing  -TB       Row Name 02/12/25 1000          Grooming Goal 1 (OT)    Activity/Device (Grooming Goal 1, OT) oral care  -TB     [place] (Grooming Goal 1, OT) moderate assist (50-74% patient effort);verbal cues required  -TB     Time Frame (Grooming Goal 1, OT) short term goal (STG);3 days  -TB     Progress/Outcome (Grooming Goal 1, OT) goal ongoing  -TB       Row Name 02/12/25 1000          Self-Feeding Goal 1 (OT)    Activity/Device (Self-Feeding Goal 1, OT) scoop food and bring to mouth;self-feeding skills, all  -TB     [place] Level/Cues Needed (Self-Feeding Goal 1, OT) moderate assist (50-74% patient effort);verbal cues required  -TB     Time Frame (Self-Feeding Goal 1, OT) short term goal (STG);3 days  -TB     Progress/Outcomes (Self-Feeding Goal 1, OT) goal ongoing  -TB               User Key   (r) = Recorded By, (t) = Taken By, (c) = Cosigned By      Initials Name Provider Type    TB Marti Ventura, OT Occupational Therapist                   Clinical Impression       Row Name 02/12/25 0954          Pain Assessment    Pretreatment Pain Rating 0/10 - no pain  -TB     Posttreatment Pain Rating 0/10 - no pain  -TB     Pre/Posttreatment Pain Comment Pt denies pain when asked. No indication of pain with activity.  -TB       Row Name 02/12/25 0954          Plan of Care Review    Plan of Care Reviewed With patient  -TB     Progress no change  -TB     Outcome Evaluation OT IE completed. Pt is Ox1 and participates in therapy with fair effort. Presents near her baseline for ADLs. Max Ax2 bed mobility, STS x3 reps, and Std pvt transfer from EOB to [facility]. Pt is Dependent with LB ADLs and toileting and requires with UB ADLs at this time. OT will follow IP 3x/week. Recommend return to NH when pt is medically ready.  -TB       Row Name 02/12/25 0954          Therapy Assessment/Plan (OT)    Rehab Potential (OT) limited  -TB     Criteria for Skilled Therapeutic Interventions Met (OT) yes;meets criteria;skilled treatment is necessary  -TB     Therapy Frequency (OT) 3 times/wk  -TB       Row Name 02/12/25 0954          Therapy Plan Review/Discharge Plan (OT)    Anticipated Discharge Disposition (OT) [facility] care facility  -TB       Row Name 02/12/25 0954          Vital Signs    Pre Systolic BP Rehab 121  RN cleared OT  -TB     Pre Treatment Diastolic BP 61  -TB     Post Systolic BP Rehab 103  -TB     Post Treatment Diastolic BP 65  -TB     Pre SpO2 (%) 93  -TB     O2 Delivery Pre Treatment supplemental O2  -TB     Post SpO2 (%) 94  -TB     O2 Delivery Post Treatment supplemental O2  -TB     Pre Patient Position Supine  -TB     Intra Patient Position Standing  -TB     Post Patient Position Sitting  -TB       Row Name 02/12/25 0954          Positioning and Restraints    Pre-Treatment Position in bed  -TB     Post  Treatment Position chair  -TB     In Chair notified nsg;reclined;call light within reach;encouraged to call for assist;exit alarm on;waffle cushion;on mechanical lift sling;legs elevated;heels elevated  SCD applied  -TB               User Key  (r) = Recorded By, (t) = Taken By, (c) = Cosigned By      Initials Name Provider Type    TB Marti Ventura OT Occupational Therapist                   Outcome Measures       Row Name 02/12/25 1003          How much help from another is currently needed...    Putting on and taking off regular lower body clothing? 1  -TB     Bathing (including washing, rinsing, and drying) 1  -TB     Toileting (which includes using toilet bed pan or urinal) 1  -TB     Putting on and taking off regular upper body clothing 2  -TB     Taking care of personal grooming (such as brushing teeth) 2  -TB     Eating meals 2  -TB     AM-PAC 6 Clicks Score (OT) 9  -TB       Row Name 02/12/25 0805          How much help from another person do you currently need...    Turning from your back to your side while in flat bed without using bedrails? 2  -MD     Moving from lying on back to sitting on the side of a flat bed without bedrails? 2  -MD     Moving to and from a bed to a chair (including a wheelchair)? 1  -MD     Standing up from a chair using your arms (e.g., wheelchair, bedside chair)? 1  -MD     Climbing 3-5 steps with a railing? 1  -MD     To walk in hospital room? 1  -MD     AM-PAC 6 Clicks Score (PT) 8  -MD       Row Name 02/12/25 1003          Functional Assessment    Outcome Measure Options AM-PAC 6 Clicks Daily Activity (OT)  -TB               User Key  (r) = Recorded By, (t) = Taken By, (c) = Cosigned By      Initials Name Provider Type    Marti Cotto OT Occupational Therapist    Ursula Jacobson, RN Registered Nurse                    Occupational Therapy Education       Title: PT OT SLP Therapies (In Progress)       Topic: Occupational Therapy (In Progress)        Point: ADL training (In Progress)       Description:   Instruct learner(s) on proper safety adaptation and remediation techniques during self care or transfers.   Instruct in proper use of assistive devices.                  Learning Progress Summary            Patient Acceptance, E,D, NR by TB at 2/12/2025 1004                      Point: Home exercise program (Not Started)       Description:   Instruct learner(s) on appropriate technique for monitoring, assisting and/or progressing therapeutic exercises/activities.                  Learner Progress:  Not documented in this visit.              Point: Precautions (Not Started)       Description:   Instruct learner(s) on prescribed precautions during self-care and functional transfers.                  Learner Progress:  Not documented in this visit.              Point: Body mechanics (Not Started)       Description:   Instruct learner(s) on proper positioning and spine alignment during self-care, functional mobility activities and/or exercises.                  Learner Progress:  Not documented in this visit.                              User Key       Initials Effective Dates Name Provider Type Discipline     07/11/23 -  Marti Ventura, OT Occupational Therapist OT                  OT Recommendation and Plan  Therapy Frequency (OT): 3 times/wk  Plan of Care Review  Plan of Care Reviewed With: patient  Progress: no change  Outcome Evaluation: OT IE completed. Pt is Ox1 and participates in therapy with fair effort. Presents near her baseline for ADLs. Max Ax2 bed mobility, STS x3 reps, and Std pvt transfer from EOB to C. Pt is Dependent with LB ADLs and toileting and requires with UB ADLs at this time. OT will follow IP 3x/week. Recommend return to NH when pt is medically ready.     Time Calculation:   Evaluation Complexity (OT)  Review Occupational Profile/Medical/Therapy History Complexity: expanded/moderate complexity  Assessment, Occupational  Performance/Identification of Deficit Complexity: 3-5 performance deficits  Clinical Decision Making Complexity (OT): detailed assessment/moderate complexity  Overall Complexity of Evaluation (OT): moderate complexity     Time Calculation- OT       Row Name 02/12/25 0905             Time Calculation- OT    OT Start Time 0905  -TB      OT Received On 02/12/25  -TB      OT Goal Re-Cert Due Date 02/22/25  -TB         Untimed Charges    OT Eval/Re-eval Minutes 58  -TB         Total Minutes    Untimed Charges Total Minutes 58  -TB       Total Minutes 58  -TB                User Key  (r) = Recorded By, (t) = Taken By, (c) = Cosigned By      Initials Name Provider Type    TB Marti Ventura, OT Occupational Therapist                  Therapy Charges for Today       Code Description Service Date Service Provider Modifiers Qty    02305703069 HC OT EVAL MOD COMPLEXITY 4 2/12/2025 Marti Ventura OT GO 1                 Marti Ventura OT  2/12/2025

## 2025-02-12 NOTE — PROGRESS NOTES
"Nutrition Services    Patient Name:  Ashley Younger  YOB: 1940  MRN: [id number]  Admit Date:  2/11/2025    Patient identified to be at nutrition risk per nurse admission screen based on indicator of \"unsure if weight loss\". Per EMR weight stable > past year. Pt does not currently meet screen criteria for nutrition risk. RD will continue to follow per protocol.    Electronically signed by:  Nemo Smalls RD  02/12/25 13:41 EST   "

## 2025-02-12 NOTE — PLAN OF CARE
Oriented to self, patient is confused without behavioral disturbance. Speech clear often illogical. Follows some commands, conversant. Bilateral breast folds severely excoriated. Currently non ambulatory, Foam dressings applied to sacrococcygeal / bilateral heels. Frequent congested cough present. VSS on 3 L. SR on cardiac mx. Q 2 T & R on place. Droplet precaution initiated.

## 2025-02-12 NOTE — PLAN OF CARE
Problem: Adult Inpatient Plan of Care  Goal: Plan of Care Review  Recent Flowsheet Documentation  Taken 2/12/2025 0954 by Marti Ventura OT  Progress: no change  Outcome Evaluation: OT IE completed. Pt is Ox1 and participates in therapy with fair effort. Presents near her baseline for ADLs. Max Ax2 bed mobility, STS x3 reps, and Std pvt transfer from EOB to C. Pt is Dependent with LB ADLs and toileting and requires with UB ADLs at this time. OT will follow IP 3x/week. Recommend return to NH when pt is medically ready.

## 2025-02-12 NOTE — PLAN OF CARE
Goal Outcome Evaluation:                   Anticipated Discharge Disposition (SLP): skilled nursing facility          SLP Swallowing Diagnosis: oral dysphagia, R/O pharyngeal dysphagia (02/12/25 1010)

## 2025-02-12 NOTE — MBS/VFSS/FEES
Acute Care - Speech Language Pathology   Swallow Initial Evaluation  Tony  Modified Barium Swallow Study (MBS)       Patient Name: Ashley Younger  : 1940  MRN: [id number]  Today's Date: 2025               Admit Date: 2025    Visit Dx:     ICD-10-CM ICD-9-CM   1. Hypoxia  R09.02 799.02   2. Influenza A  J10.1 487.1   3. Acute UTI (urinary tract infection)  N39.0 599.0   4. Dysphagia, unspecified type  R13.10 787.20     Patient Active Problem List   Diagnosis    Common bile duct (CBD) obstruction    Hypertension    Sepsis    Fracture of left shoulder girdle, part unspecified, subsequent encounter for fracture with routine healing    Muscle weakness (generalized)    Urinary tract infection, site not specified    Wedge compression fracture of t5-T6 vertebra, subsequent encounter for fracture with routine healing    Encephalopathy, unspecified    Fall at home    Dehydration    Cholangitis    Influenza    Hypoxia     Past Medical History:   Diagnosis Date    BENJIE (acute kidney injury)     Arthritis     Cholelithiasis     Chronic kidney disease, stage 3 unspecified     DJD (degenerative joint disease)     Encounter for removal of sutures     GERD (gastroesophageal reflux disease)     Headache     History of  brain aneurysm     Hyperlipidemia     Hypertension     Orthostatic hypotension     Osteoarthritis     Presence of neurostimulator     Recurrent falls     Renal insufficiency     Scalp laceration     Urinary tract infection      Past Surgical History:   Procedure Laterality Date    CHOLECYSTECTOMY      CRANIAL NEUROSTIMULATOR INSERTION/REPLACEMENT      TUMOR    ERCP      WITH STONE REMOVED    ORIF WRIST FRACTURE Left        SLP Recommendation and Plan  SLP Swallowing Diagnosis: moderate, oral dysphagia, mod-severe, pharyngeal dysphagia (25 1405)  SLP Diet Recommendation: mechanical ground textures, honey thick liquids, no mixed consistencies (25 1405)  Recommended Precautions and  Strategies: general aspiration precautions, assist with feeding, no straw, alternate between small bites of food and sips of liquid (cue pt for throat clear + extra intermittently) (02/12/25 1405)  SLP Rec. for Method of Medication Administration: meds whole, meds crushed, with puree, as tolerated (02/12/25 1405)     Monitor for Signs of Aspiration: yes, notify SLP if any concerns (02/12/25 1405)  Recommended Diagnostics: reassess via VFSS ([facility]) (02/12/25 1010)  Swallow Criteria for Skilled Therapeutic Interventions Met: demonstrates skilled criteria (02/12/25 1405)  Anticipated Discharge Disposition (SLP): skilled nursing facility (02/12/25 1405)  Rehab Potential/Prognosis, Swallowing: good, to achieve stated therapy goals (02/12/25 1405)  Therapy Frequency (Swallow): 5 days per week (02/12/25 1405)  Predicted Duration Therapy Intervention (Days): 1 week (02/12/25 1405)  Oral Care Recommendations: Oral Care BID/PRN, Suction toothbrush (02/12/25 1405)                                               SWALLOW EVALUATION (Last 72 Hours)       SLP Adult Swallow Evaluation       Row Name 02/12/25 1405 02/12/25 1010                Rehab Evaluation    Document Type evaluation  - evaluation  -       Subjective Information no complaints  - no complaints  -       Patient Observations alert;cooperative  - alert;cooperative  -       Patient/Family/Caregiver Comments/Observations no family present  - family present  -       Patient Effort good  - good  -       Symptoms Noted During/After Treatment none  - none  -          General Information    Patient Profile Reviewed yes  - yes  -       Pertinent History Of Current Problem See initial eval, pt referred for [facility]  - Adm w/ hypoxia, flu A + on arrival. Hx: dementia, GERD, HTN, HLD. CXR w/o evidence of acute cardiopulmonary findings  -       Current Method of Nutrition mechanical ground textures;thin liquids  - NPO  -       Precautions/Limitations,  "Vision WFL;for purposes of [facility] WFL;for purposes of [facility]       Precautions/Limitations, Hearing WFL;for purposes of [facility] WFL;for purposes of [facility]       Prior Level of Function-Communication other (see comments)  dementia per chart  - other (see comments)  dementia per chart  -       Prior Level of Function-Swallowing other (see comments)  MBS completed @ OSH (October '23) w/ recs for regular solid diet w/ HTL. Repeat MBS (October '23) w/ recs for soft solid diet w/ NTL. Family reports pt has since been on \"soft\" solid diet w/ thin liquids.  - other (see comments)  MBS completed @ OSH (October '23) w/ recs for regular solid diet w/ HTL. Repeat MBS (October '23) w/ recs for soft solid diet w/ NTL. Family reports pt has since been on \"soft\" solid diet w/ thin liquids.  -       Plans/Goals Discussed with patient;agreed upon  - patient;family;agreed upon  -       Barriers to Rehab none identified  - none identified  -       Patient's Goals for Discharge patient did not state  - patient did not state  -       Family Goals for Discharge -- family did not state  -          Pain    Additional Documentation Pain Scale: FACES Pre/Post-Treatment (Group)  - Pain Scale: FACES Pre/Post-Treatment (Group)  -          Pain Scale: FACES Pre/Post-Treatment    Pain: FACES Scale, Pretreatment 0-->no hurt  - 0-->no hurt  -       Posttreatment Pain Rating 0-->no hurt  - 0-->no hurt  -          Oral Motor Structure and Function    Secretion Management -- WNL/WFL  -       Mucosal Quality -- moist, healthy  -          Oral Musculature and Cranial Nerve Assessment    Oral Motor General Assessment -- generalized oral motor weakness  -          General Eating/Swallowing Observations    Respiratory Support Currently in Use -- nasal cannula  -       O2 Liters -- 6L  -       Eating/Swallowing Skills -- self-fed;fed by SLP  -       Positioning During Eating -- upright 90 " "degree;upright in chair  -       Utensils Used -- spoon;cup;straw  -       Consistencies Trialed -- ice chips;thin liquids;pureed;regular textures  -          Clinical Swallow Eval    Oral Prep Phase -- impaired  -       Oral Residue -- impaired  -       Clinical Swallow Evaluation Summary -- Pt w/ intermittent cough t/o eval & prior to PO trials. Immediate cough w/ thins via straw only. No additional s/s of aspiration accross trials of thin via cup, pureed, or w/ regular solid consistenices. Oral phase generally weak appearing. Pt w/ prolonged mastication & residue w/ regular solid trial. Family reports pt is \"soft\" solid diet w/ thin liquids @ baseline. Recommend mechanical ground solid diet w/ thin liquids, no straw.  Plan for [facility] this PM  -          Oral Prep Concerns    Oral Prep Concerns -- prolonged mastication  -       Prolonged Mastication -- regular consistencies  [facility]          Oral Residue Concerns    Oral Residue Concerns -- diffuse residue throughout oral cavity  -       Diffuse Residue Throughout Oral Cavity -- regular consistencies  -          MBS/VFSS    Utensils Used cup;spoon;straw  - --       Consistencies Trialed thin liquids;nectar/syrup-thick liquids;honey-thick liquids;pudding thick;regular textures  - --          MBS/VFSS Interpretation    Oral Prep Phase impaired oral phase of swallowing  - --       Oral Transit Phase impaired  - --       Oral Residue impaired  - --       VFSS Summary Moderate oral and moderate to severe pharyngeal dysphagia. Deep penetration w/ thins before, during & after the swallow. Penetration w/ NTL during the swallow w/ continued penetration after the swallow. Diffuse residue w/ all consistenices assessed, residue worse w/ pudding & regular solid consistenices. Penetration w/ pudding when mixed w/ residue from previous trial that pt was u/a to fully clear. Penetration w/ HTL/residue. No aspiration directly observed, however pt felt to be @ " "general risk of aspiration w/ all PO intake @ this time. Discussed results & recommendations w/ MD, ok'toney \"safest\" PO diet option. If cont PO diet, recommend mechanical ground solid diet w/ HTL, no straw, assist w/ feeding, cue pt for throat clear + extra intermittently, alternate bites/sips. SLP will cont to f/u for tx  -MH --          Oral Preparatory Phase    Oral Preparatory Phase reduced lip closure around utensil;anterior loss;prolonged manipulation  - --       Reduced Lip Closure Around Utensil thin liquids;nectar-thick liquids;secondary to impaired cognitive status;secondary to reduced lingual range of motion;secondary to reduced lingual strength  -MH --       Anterior Loss thin liquids;secondary to reduced lingual range of motion;secondary to reduced lingual strength;secondary to reduced labial seal;secondary to impaired cognitive status  - --       Prolonged Manipulation pudding/puree;regular textures;secondary to reduced lingual range of motion;secondary to impaired cognitive status;secondary to reduced lingual strength;secondary to reduced labial seal  - --          Oral Transit Phase    Impaired Oral Transit Phase increased A-P transit time;premature spillage of liquids into pharynx  - --       Increased A-P Transit Time all consistencies tested;secondary to impaired cognitive status;discoordination of lingual movement;secondary to reduced lingual control  - --       Premature Spillage of Liquids into Pharynx thin liquids;nectar-thick liquids;discoordination of lingual movement;secondary to impaired cognitive status;secondary to reduced lingual control  - --          Oral Residue    Impaired Oral Residue diffuse residue throughout oral cavity  - --       Diffuse Residue throughout Oral Cavity all consistencies tested;secondary to reduced lingual range of motion;secondary to reduced lingual strength  - --       Response to Oral Residue partial residue clearance;with spontaneous subsequent " swallow  -MH --          Initiation of Pharyngeal Swallow    Initiation of Pharyngeal Swallow bolus in pyriform sinuses  -MH --       Pharyngeal Phase impaired pharyngeal phase of swallowing  -MH --       Anatomical abnormalities noted osteophyte/bone spur per radiology report;reverse lordotic c-spine curvature;hyperlordotic c-spine curvature  -MH --       Anatomical abnormalities functional impact functional impact on swallowing;other (see comments)  contributes to residue  -MH --       Penetration Before the Swallow thin liquids;secondary to delayed swallow initiation or mistiming;secondary to reduced back of tongue control  -MH --       Penetration During the Swallow thin liquids;nectar-thick liquids;honey-thick liquids;pudding/puree;secondary to delayed swallow initiation or mistiming;secondary to reduced laryngeal elevation;secondary to reduced vestibular closure  -MH --       Penetration After the Swallow thin liquids;nectar-thick liquids;secondary to residue;in valleculae;in pyriform sinuses  -MH --       Depth of Penetration deep  - --       Response to Penetration No  -MH --       No spontaneous response to penetration and non-effective laryngeal clearance with cue (see comments);other (see comments)  cued cough  -MH --       Rosenbek's Scale thin:;nectar:;3--->level 3;honey:;pudding/puree:;2--->level 2  -MH --       Pharyngeal Residue all consistencies tested;diffuse within pharynx;secondary to reduced hyolaryngeal excursion;secondary to reduced laryngeal elevation;secondary to reduced posterior pharyngeal wall stripping;secondary to reduced base of tongue retraction  - --       Response to Residue unable to clear residue;cleared residue with spontaneous subsequent swallow;cleared residue with cued swallow  - --       Attempted Compensatory Maneuvers bolus size;bolus presentation style;additional subsequent swallow;multiple swallows  - --       Response to Attempted Compensatory Maneuvers did not  prevent penetration  - --       Successful Compensatory Maneuver Competency patient able to;demonstrate compensations;with cues  - --          SLP Evaluation Clinical Impression    SLP Swallowing Diagnosis moderate;oral dysphagia;mod-severe;pharyngeal dysphagia  - oral dysphagia;R/O pharyngeal dysphagia  -       Functional Impact risk of aspiration/pneumonia  - risk of aspiration/pneumonia  -       Rehab Potential/Prognosis, Swallowing good, to achieve stated therapy goals  - good, to achieve stated therapy goals  -       Swallow Criteria for Skilled Therapeutic Interventions Met demonstrates skilled criteria  - demonstrates skilled criteria  -          Recommendations    Therapy Frequency (Swallow) 5 days per week  - --       Predicted Duration Therapy Intervention (Days) 1 week  - 1 week  -       SLP Diet Recommendation mechanical ground textures;honey thick liquids;no mixed consistencies  - mechanical ground textures;thin liquids  -       Recommended Diagnostics -- reassess via VFSS (MBS)  -       Recommended Precautions and Strategies general aspiration precautions;assist with feeding;no straw;alternate between small bites of food and sips of liquid  cue pt for throat clear + extra intermittently  - general aspiration precautions;no straw  -       Oral Care Recommendations Oral Care BID/PRN;Suction toothbrush  - Oral Care BID/PRN;Toothbrush  -       SLP Rec. for Method of Medication Administration meds whole;meds crushed;with puree;as tolerated  - meds whole;meds crushed;with puree;as tolerated  -       Monitor for Signs of Aspiration yes;notify SLP if any concerns  - yes;notify SLP if any concerns  -       Anticipated Discharge Disposition (SLP) skilled nursing facility  - skilled nursing facility  -                 User Key  (r) = Recorded By, (t) = Taken By, (c) = Cosigned By      Initials Name Effective Dates    Mikaela Rueda, MS [facility]-SLP 05/12/23 -                      EDUCATION  The patient has been educated in the following areas:   Dysphagia (Swallowing Impairment) Oral Care/Hydration Modified Diet Instruction.        SLP GOALS       Row Name 02/12/25 1405             (LTG) Patient will demonstrate functional swallow for    Diet Texture (Demonstrate functional swallow) regular textures  -      Liquid viscosity (Demonstrate functional swallow) thin liquids  -      [place] (Demonstrate functional swallow) with minimal cues (75-90% accuracy)  -      Time Frame (Demonstrate functional swallow) 1 week  -      Progress/Outcomes (Demonstrate functional swallow) new goal  -         (STG) Patient will tolerate trials of    Consistencies Trialed (Tolerate trials) mechanical ground textures;honey/ moderately thick liquids  -      Desired Outcome (Tolerate trials) without signs/symptoms of aspiration;with adequate oral prep/transit/clearance;with use of compensatory strategies (see comments)  no straw, assist w/ feeding, cue pt for throat clear + extra intermittently, alternate bites/sips  -      Jamul (Tolerate trials) with 1:1 assist/ supervision  -      Time Frame (Tolerate trials) 1 week  -      Progress/Outcomes (Tolerate trials) new goal  -         ([facility]) Lingual Strengthening Goal 1 (SLP)    Activity (Lingual Strengthening Goal 1, SLP) increase lingual tone/sensation/control/coordination/movement;increase tongue back strength  -      Increase Lingual Tone/Sensation/Control/Coordination/Movement lingual resistance exercises;swallow trials  -      Increase Tongue Back Strength lingual resistance exercises  -      [place]/Accuracy (Lingual Strengthening Goal 1, SLP) with moderate cues (50-74% accuracy)  -      Time Frame (Lingual Strengthening Goal 1, SLP) 1 week  -      Progress/Outcomes (Lingual Strengthening Goal 1, SLP) new goal  -         ([facility]) Pharyngeal Strengthening Exercise Goal 1 (SLP)    Activity (Pharyngeal  Strengthening Goal 1, SLP) increase timing;increase superior movement of the hyolaryngeal complex;increase anterior movement of the hyolaryngeal complex;increase closure at entrance to airway/closure of airway at glottis;increase squeeze/positive pressure generation  -      Increase Timing prepping - 3 second prep or suck swallow or 3-step swallow  -      Increase Superior Movement of the Hyolaryngeal Complex effortful pitch glide (falsetto + pharyngeal squeeze)  -      Increase Anterior Movement of the Hyolaryngeal Complex chin tuck against resistance (CTAR)  -      Increase Closure at Entrance to Airway/Closure of Airway at Glottis supraglottic swallow  -      Increase Squeeze/Positive Pressure Generation hard effortful swallow  -      [place]/Accuracy (Pharyngeal Strengthening Goal 1, SLP) with moderate cues (50-74% accuracy)  -      Time Frame (Pharyngeal Strengthening Goal 1, SLP) 1 week  -      Progress/Outcomes (Pharyngeal Strengthening Goal 1, SLP) new goal  -                User Key  (r) = Recorded By, (t) = Taken By, (c) = Cosigned By      Initials Name Provider Type    Mikaela Rueda, MS CCC-SLP Speech and Language Pathologist                         Time Calculation:    Time Calculation- SLP       Row Name 02/12/25 1540 02/12/25 1151          Time Calculation- SLP    SLP Start Time 1405  - 1010  -     SLP Received On 02/12/25  - 02/12/25  -        Untimed Charges    87581-XR Eval Oral Pharyng Swallow Minutes -- 40  -MH     01609-[id number] Motion Fluoro Eval Swallow Minutes 65  -MH --        Total Minutes    Untimed Charges Total Minutes 65  - 40  -      Total Minutes 65  - 40  -               User Key  (r) = Recorded By, (t) = Taken By, (c) = Cosigned By      Initials Name Provider Type    Mikaela Rueda, MS CCC-SLP Speech and Language Pathologist                    Therapy Charges for Today       Code Description Service Date Service Provider Modifiers Qty     [id number] HC ST EVAL ORAL PHARYNG SWALLOW 3 2/12/2025 Mikaela Guzman, MS CCC-SLP GN 1    [id number] HC ST MOTION FLUORO EVAL SWALLOW 4 2/12/2025 Mikaela Guzman, MS CCC-SLP GN 1                 Mikaela Guzman MS CCC-HANDY  2/12/2025

## 2025-02-12 NOTE — THERAPY EVALUATION
Acute Care - Speech Language Pathology   Swallow Initial Evaluation [facility]  Clinical Swallow Evaluation       Patient Name: Ashley Younger  : 1940  MRN: [id number]  Today's Date: 2025               Admit Date: 2025    Visit Dx:     ICD-10-CM ICD-9-CM   1. Hypoxia  R09.02 799.02   2. Influenza A  J10.1 487.1   3. Acute UTI (urinary tract infection)  N39.0 599.0   4. Dysphagia, unspecified type  R13.10 787.20     Patient Active Problem List   Diagnosis    Common bile duct (CBD) obstruction    Hypertension    Sepsis    Fracture of left shoulder girdle, part unspecified, subsequent encounter for fracture with routine healing    Muscle weakness (generalized)    Urinary tract infection, site not specified    Wedge compression fracture of t5-T6 vertebra, subsequent encounter for fracture with routine healing    Encephalopathy, unspecified    Fall at home    Dehydration    Cholangitis    Influenza    Hypoxia     Past Medical History:   Diagnosis Date    BENJIE (acute kidney injury)     Arthritis     Cholelithiasis     Chronic kidney disease, stage 3 unspecified     DJD (degenerative joint disease)     Encounter for removal of sutures     GERD (gastroesophageal reflux disease)     Headache     History of  brain aneurysm     Hyperlipidemia     Hypertension     Orthostatic hypotension     Osteoarthritis     Presence of neurostimulator     Recurrent falls     Renal insufficiency     Scalp laceration     Urinary tract infection      Past Surgical History:   Procedure Laterality Date    CHOLECYSTECTOMY      CRANIAL NEUROSTIMULATOR INSERTION/REPLACEMENT      TUMOR    ERCP      WITH STONE REMOVED    ORIF WRIST FRACTURE Left        SLP Recommendation and Plan  SLP Swallowing Diagnosis: oral dysphagia, R/O pharyngeal dysphagia (25 1010)  SLP Diet Recommendation: mechanical ground textures, thin liquids (25 1010)  Recommended Precautions and Strategies: general aspiration precautions, no straw  "(02/12/25 1010)  SLP Rec. for Method of Medication Administration: meds whole, meds crushed, with puree, as tolerated (02/12/25 1010)     Monitor for Signs of Aspiration: yes, notify SLP if any concerns (02/12/25 1010)  Recommended Diagnostics: reassess via VFSS (MBS) (02/12/25 1010)  Swallow Criteria for Skilled Therapeutic Interventions Met: demonstrates skilled criteria (02/12/25 1010)  Anticipated Discharge Disposition (SLP): skilled nursing facility (02/12/25 1010)  Rehab Potential/Prognosis, Swallowing: good, to achieve stated therapy goals (02/12/25 1010)     Predicted Duration Therapy Intervention (Days): 1 week (02/12/25 1010)  Oral Care Recommendations: Oral Care BID/PRN, Toothbrush (02/12/25 1010)                                               SWALLOW EVALUATION (Last 72 Hours)       SLP Adult Swallow Evaluation       Row Name 02/12/25 1010                   Rehab Evaluation    Document Type evaluation  -        Subjective Information no complaints  -        Patient Observations alert;cooperative  -        Patient/Family/Caregiver Comments/Observations family present  -        Patient Effort good  -        Symptoms Noted During/After Treatment none  -           General Information    Patient Profile Reviewed yes  -        Pertinent History Of Current Problem Adm w/ hypoxia, flu A + on arrival. Hx: dementia, GERD, HTN, HLD. CXR w/o evidence of acute cardiopulmonary findings  -        Current Method of Nutrition NPO  -        Precautions/Limitations, Vision WFL;for purposes of eval  -        Precautions/Limitations, Hearing WFL;for purposes of eval  -        Prior Level of Function-Communication other (see comments)  dementia per chart  -        Prior Level of Function-Swallowing other (see comments)  MBS completed @ OSH (October '23) w/ recs for regular solid diet w/ HTL. Repeat MBS (October '23) w/ recs for soft solid diet w/ NTL. Family reports pt has since been on \"soft\" solid " "diet w/ thin liquids.  -        Plans/Goals Discussed with patient;family;agreed upon  [facility]        Barriers to Rehab none identified  -        Patient's Goals for Discharge patient did not state  -        Family Goals for Discharge family did not state  -           Pain    Additional Documentation Pain Scale: FACES Pre/Post-Treatment (Group)  -           Pain Scale: FACES Pre/Post-Treatment    Pain: FACES Scale, Pretreatment 0-->no hurt  -        Posttreatment Pain Rating 0-->no hurt  -           Oral Motor Structure and Function    Secretion Management WNL/WFL  -        Mucosal Quality moist, healthy  -           Oral Musculature and Cranial Nerve Assessment    Oral Motor General Assessment generalized oral motor weakness  -           General Eating/Swallowing Observations    Respiratory Support Currently in Use nasal cannula  -        O2 Liters 6L  -        Eating/Swallowing Skills self-fed;fed by SLP  -        Positioning During Eating upright 90 degree;upright in chair  -        Utensils Used spoon;cup;straw  -        Consistencies Trialed ice chips;thin liquids;pureed;regular textures  -           Clinical Swallow Eval    Oral Prep Phase impaired  -        Oral Residue impaired  -        Clinical Swallow Evaluation Summary Pt w/ intermittent cough t/o eval & prior to PO trials. Immediate cough w/ thins via straw only. No additional s/s of aspiration accross trials of thin via cup, pureed, or w/ regular solid consistenices. Oral phase generally weak appearing. Pt w/ prolonged mastication & residue w/ regular solid trial. Family reports pt is \"soft\" solid diet w/ thin liquids @ baseline. Recommend mechanical ground solid diet w/ thin liquids, no straw.  [place] [facility] [place]  -           Oral Prep Concerns    Oral Prep Concerns prolonged mastication  -        Prolonged Mastication regular consistencies  -           Oral Residue Concerns    Oral Residue Concerns diffuse " residue throughout oral cavity  -        Diffuse Residue Throughout Oral Cavity regular consistencies  -           SLP Evaluation Clinical Impression    SLP Swallowing Diagnosis oral dysphagia;R/O pharyngeal dysphagia  -        Functional Impact risk of aspiration/pneumonia  -        Rehab Potential/Prognosis, Swallowing good, to achieve stated therapy goals  -        Swallow Criteria for Skilled Therapeutic Interventions Met demonstrates skilled criteria  -           Recommendations    Predicted Duration Therapy Intervention (Days) 1 week  -        SLP Diet Recommendation mechanical ground textures;thin liquids  -        Recommended Diagnostics reassess via VFSS (MBS)  -        Recommended Precautions and Strategies general aspiration precautions;no straw  -        Oral Care Recommendations Oral Care BID/PRN;Toothbrush  -        SLP Rec. for Method of Medication Administration meds whole;meds crushed;with puree;as tolerated  -        Monitor for Signs of Aspiration yes;notify SLP if any concerns  -        Anticipated Discharge Disposition (SLP) skilled nursing facility  -                  User Key  (r) = Recorded By, (t) = Taken By, (c) = Cosigned By      Initials Name Effective Dates    Mikaela Rueda MS CCC-SLP 05/12/23 -                     EDUCATION  The patient has been educated in the following areas:   Dysphagia (Swallowing Impairment) Oral Care/Hydration Modified Diet Instruction.                Time Calculation:    Time Calculation- SLP       Row Name 02/12/25 1151             Time Calculation- [facility]    SLP Start Time 1010  -      SLP Received On 02/12/25  -         Untimed Charges    [id number] Eval Oral Pharyng Swallow Minutes 40  -         Total Minutes    Untimed Charges Total Minutes 40  -       Total Minutes 40  -                User Key  (r) = Recorded By, (t) = Taken By, (c) = Cosigned By      Initials Name Provider Type     Mikaela Guzman MS CCC-SLP  Speech and Language Pathologist                    Therapy Charges for Today       Code Description Service Date Service Provider Modifiers Qty    [id number]  ST EVAL ORAL PHARYNG SWALLOW 3 2/12/2025 Mikaela Guzman, MS DEA-SLP GN 1                 Mikaela Guzman MS CCC-SLP  2/12/2025

## 2025-02-12 NOTE — PROGRESS NOTES
[facility] Medicine Services  PROGRESS NOTE    Patient Name: Ashley Younger  : 1940  MRN: [id number]    Date of Admission: 2025  Primary Care Physician: John Chen MD    Subjective   Subjective     CC:  Influenza/hypoxia    HPI:  Patient pleasant, resting on 6L during visit with her. She was on 2-3 L through night. Appears comfortable, no family present    Ros   As above      Objective   Objective     Vital Signs:   Temp:  [97.7 °F (36.5 °C)-98.2 °F (36.8 °C)] 98.2 °F (36.8 °C)  Heart Rate:  [75-91] 77  Resp:  [18] 18  BP: (110-142)/(49-98) 110/62  Flow (L/min) (Oxygen Therapy):  [2-6] 6     Physical Exam:  GEN: NAD, resting in bed, awake  HEENT: on 6L, atraumatic, normocephalic  NECK: supple, no masses  RESP: on 6L, normal effort but coarse  CV: on tele, sinus rhythm  PSYCH: normal affect, appropriate  NEURO: awake, alert, no focal deficits noted  MSK: no edema noted  SKIN: no rashes noted       Results Reviewed:  LAB RESULTS:      Lab 25  0834 25  1148   WBC 6.35 4.84   HEMOGLOBIN 13.5 13.2   HEMATOCRIT 43.4 42.2   PLATELETS 165 164   NEUTROS ABS 3.79 2.63   IMMATURE GRANS (ABS) 0.02 0.01   LYMPHS ABS 1.50 1.27   MONOS ABS 0.99* 0.89   EOS ABS 0.03 0.02   MCV 99.5* 99.1*   LACTATE  --  0.7         Lab 25  0834 25  1148   SODIUM 142 141   POTASSIUM 4.0 3.9   CHLORIDE 100 103   CO2 31.0* 30.0*   ANION GAP 11.0 8.0   BUN 21 18   CREATININE 0.94 0.75   EGFR 60.7 79.6   GLUCOSE 89 104*   CALCIUM 8.2* 8.3*   MAGNESIUM 1.9 1.9   PHOSPHORUS 3.8  --          Lab 25  0834 25  1148   TOTAL PROTEIN 6.5 6.7   ALBUMIN 3.8 3.9   GLOBULIN 2.7 2.8   ALT (SGPT) 12 9   AST (SGOT) 30 31   BILIRUBIN 0.5 0.4   ALK PHOS 72 76                     Brief Urine Lab Results  (Last result in the past 365 days)        Color   Clarity   Blood   Leuk Est   Nitrite   Protein   CREAT   Urine HCG        25 1338 Yellow   Clear   Moderate (2+)   Trace    Positive   Trace                   Microbiology Results Abnormal       Procedure Component Value - Date/Time    COVID PRE-OP / PRE-PROCEDURE SCREENING ORDER (NO ISOLATION) - Swab, Nasopharynx [[id number]]  (Abnormal) Collected: 02/11/25 1128    Lab Status: Final result Specimen: Swab from Nasopharynx Updated: 02/11/25 1346    Narrative:      The following orders were created for panel order COVID PRE-OP / PRE-PROCEDURE SCREENING ORDER (NO ISOLATION) - Swab, Nasopharynx.  Procedure                               Abnormality         Status                     ---------                               -----------         ------                     COVID-19, FLU A/B, RSV P...[[id number]]  Abnormal            Final result                 Please view results for these tests on the individual orders.    COVID-19, FLU A/B, RSV PCR 1 HR TAT - Swab, Nasopharynx [[id number]]  (Abnormal) Collected: 02/11/25 1128    Lab Status: Final result Specimen: Swab from Nasopharynx Updated: 02/11/25 1346     COVID19 Not Detected     Influenza A PCR Detected     Influenza B PCR Not Detected     RSV, PCR Not Detected    Narrative:      Fact sheet for providers: https://www.fda.gov/media/671881/download    Fact sheet for patients: https://www.fda.gov/media/173201/download    Test performed by PCR.            XR Chest 1 View    Result Date: 2/11/2025  XR CHEST 1 VW Date of Exam: 2/11/2025 11:18 AM EST Indication: productive cough, dementia with aspiration risk Comparison: AP chest 3/18/2025. Findings: Low volume inspiration. No acute airspace disease is seen. Heart size appears borderline prominent, favored to represent accentuated contour related to kyphotic positioning. No pleural effusion or pneumothorax or acute osseous abnormalities are identified. Upper lumbar levoscoliosis. Cholecystectomy.     Impression: Impression: No acute cardiopulmonary findings. Electronically Signed: Maria Del Carmen Zuniag MD  2/11/2025 11:59 AM EST  Workstation ID: [id number]          Current medications:  Scheduled Meds:amLODIPine, 2.5 mg, Oral, BID  atorvastatin, 20 mg, Oral, Nightly  donepezil, 5 mg, Oral, Nightly  furosemide, 20 mg, Oral, Daily  guaiFENesin, 600 mg, Oral, Q12H  heparin (porcine), 5,000 Units, Subcutaneous, Q12H  melatonin, 5 mg, Oral, Nightly  oseltamivir, 30 mg, Oral, Q12H  pantoprazole, 40 mg, Oral, Daily  phenol, 1 spray, Mouth/Throat, BID  sodium chloride, 10 mL, Intravenous, Q12H      Continuous Infusions:Pharmacy to Dose Oseltamivir (TAMIFLU),       PRN Meds:.  acetaminophen **OR** acetaminophen **OR** acetaminophen    senna-docusate sodium **AND** polyethylene glycol **AND** bisacodyl **AND** bisacodyl    Calcium Replacement - Follow Nurse / BPA Driven Protocol    Magnesium Standard Dose Replacement - Follow Nurse / BPA Driven Protocol    nitroglycerin    Pharmacy to Dose Oseltamivir (TAMIFLU)    Phosphorus Replacement - Follow Nurse / BPA Driven Protocol    Potassium Replacement - Follow Nurse / BPA Driven Protocol    sodium chloride    sodium chloride    Assessment & Plan   Assessment & Plan     Active Hospital Problems    Diagnosis  POA    **Influenza [J11.1]  Yes    Hypoxia [R09.02]  Unknown    Urinary tract infection, site not specified [N39.0]  Yes      Resolved Hospital Problems   No resolved problems to display.        Brief Hospital Course to date:  Ashley Younger is a 84 y.o. female with PMH significant for dementia, GERD, HLD, HTN presents [facility] ED for complaint of hypoxia. Patient experiencing flu-like symptoms x 2 days, found to be Influenza A + on arrival. Discovered have UTI as well.      Influenza A + - POA  Hypoxia  Remote hx of COPD  - Patient began exhibiting flu-like symptoms 2 days prior to admission, patient's daughter reports patient has hx of COPD. Patient was never prescribed supplemental O2 however, she reportedly shared supplemental O2 with her family member  - CXR in ED negative for acute process  - Patient is at an increased  risk of serious complications from influenza due to her age (> 66 y/o) and PMH significant for dementia, will initiate on Tamiflu x 5 days  - Wean supplemental O2 as tolerated-- requiring a bit more at 6L this morning- will obtain xray   - Supportive care     UTI - POA  - Remote hx of a procedure to reduce occurrence of UTI at [place] per patient's daughter  - U/A in ED + nitrites, trace leuks, 2+ blood, 6-10 WBCs, 4+ bacteria  - Urine cx pending? Though will double check as not sure if it was sent   - Allergy to PCNs in chart, received Fosfomycin in ED     Dementia  Dysphagia  - Continue Aricept and melatonin  - Modified diet     HTN  - Continue amlodipine and Lasix     HLD  - Continue statin     GERD  - Continue PPI    Expected Discharge Location and Transportation: home  Expected Discharge   Expected Discharge Date: 2/13/2025; Expected Discharge Time:      VTE Prophylaxis:  Pharmacologic VTE prophylaxis orders are present.         AM-PAC 6 Clicks Score (PT): 8 (02/12/25 0805)    CODE STATUS:   Code Status and Medical Interventions: No CPR (Do Not Attempt to Resuscitate); Limited Support; No intubation (DNI)   Ordered at: 02/11/25 2098     Medical Intervention Limits:    No intubation (DNI)     Level Of Support Discussed With:    Next of Kin (If No Surrogate)     Code Status (Patient has no pulse and is not breathing):    No CPR (Do Not Attempt to Resuscitate)     Medical Interventions (Patient has pulse or is breathing):    Limited Support       Renetta Ulrich MD  02/12/25

## 2025-02-12 NOTE — PLAN OF CARE
Goal Outcome Evaluation:  Plan of Care Reviewed With: patient           Outcome Evaluation: Pt. presents below baseline function w/generalized weakness affecting her ability to safely participate in functional mobility. She performed bed mobility and transfers w/max assist of 2. Activity limited by instability. Pt. would benefit from acute PT services to address stated deficits.    Anticipated Discharge Disposition (PT): home with 24/7 care, home with home health (return to previous residence)

## 2025-02-13 LAB
ALBUMIN SERPL-MCNC: 3.6 G/DL (ref 3.5–5.2)
ALBUMIN/GLOB SERPL: 1.2 G/DL
ALP SERPL-CCNC: 73 U/L (ref 39–117)
ALT SERPL W P-5'-P-CCNC: 9 U/L (ref 1–33)
ANION GAP SERPL CALCULATED.3IONS-SCNC: 14 MMOL/L (ref 5–15)
ARTERIAL PATENCY WRIST A: POSITIVE
AST SERPL-CCNC: 25 U/L (ref 1–32)
ATMOSPHERIC PRESS: ABNORMAL MM[HG]
BASE EXCESS BLDA CALC-SCNC: 7.8 MMOL/L (ref 0–2)
BASOPHILS # BLD AUTO: 0.02 10*3/MM3 (ref 0–0.2)
BASOPHILS NFR BLD AUTO: 0.2 % (ref 0–1.5)
BDY SITE: ABNORMAL
BILIRUB SERPL-MCNC: 0.5 MG/DL (ref 0–1.2)
BODY TEMPERATURE: 37
BUN SERPL-MCNC: 23 MG/DL (ref 8–23)
BUN/CREAT SERPL: 26.7 (ref 7–25)
CALCIUM SPEC-SCNC: 8.3 MG/DL (ref 8.6–10.5)
CHLORIDE SERPL-SCNC: 99 MMOL/L (ref 98–107)
CO2 BLDA-SCNC: 34.3 MMOL/L (ref 22–33)
CO2 SERPL-SCNC: 26 MMOL/L (ref 22–29)
COHGB MFR BLD: 0.8 % (ref 0–2)
CREAT SERPL-MCNC: 0.86 MG/DL (ref 0.57–1)
D-LACTATE SERPL-SCNC: 1.5 MMOL/L (ref 0.5–2)
DEPRECATED RDW RBC AUTO: 52.9 FL (ref 37–54)
EGFRCR SERPLBLD CKD-EPI 2021: 67.5 ML/MIN/1.73
EOSINOPHIL # BLD AUTO: 0.01 10*3/MM3 (ref 0–0.4)
EOSINOPHIL NFR BLD AUTO: 0.1 % (ref 0.3–6.2)
EPAP: 0
ERYTHROCYTE [DISTWIDTH] IN BLOOD BY AUTOMATED COUNT: 14.2 % (ref 12.3–15.4)
GLOBULIN UR ELPH-MCNC: 3.1 GM/DL
GLUCOSE SERPL-MCNC: 109 MG/DL (ref 65–99)
HCO3 BLDA-SCNC: 32.9 MMOL/L (ref 20–26)
HCT VFR BLD AUTO: 45.4 % (ref 34–46.6)
HCT VFR BLD CALC: 42.6 % (ref 38–51)
HGB BLD-MCNC: 14.2 G/DL (ref 12–15.9)
HGB BLDA-MCNC: 13.9 G/DL (ref 14–18)
IMM GRANULOCYTES # BLD AUTO: 0.02 10*3/MM3 (ref 0–0.05)
IMM GRANULOCYTES NFR BLD AUTO: 0.2 % (ref 0–0.5)
INHALED O2 CONCENTRATION: 90 %
IPAP: 0
LYMPHOCYTES # BLD AUTO: 1.83 10*3/MM3 (ref 0.7–3.1)
LYMPHOCYTES NFR BLD AUTO: 19.4 % (ref 19.6–45.3)
MCH RBC QN AUTO: 31.1 PG (ref 26.6–33)
MCHC RBC AUTO-ENTMCNC: 31.3 G/DL (ref 31.5–35.7)
MCV RBC AUTO: 99.6 FL (ref 79–97)
METHGB BLD QL: 0.1 % (ref 0–1.5)
MODALITY: ABNORMAL
MONOCYTES # BLD AUTO: 0.9 10*3/MM3 (ref 0.1–0.9)
MONOCYTES NFR BLD AUTO: 9.5 % (ref 5–12)
NEUTROPHILS NFR BLD AUTO: 6.66 10*3/MM3 (ref 1.7–7)
NEUTROPHILS NFR BLD AUTO: 70.6 % (ref 42.7–76)
NRBC BLD AUTO-RTO: 0 /100 WBC (ref 0–0.2)
OXYHGB MFR BLDV: 94.9 % (ref 94–99)
PAW @ PEAK INSP FLOW SETTING VENT: 0 CMH2O
PCO2 BLDA: 46.7 MM HG (ref 35–45)
PCO2 TEMP ADJ BLD: 46.7 MM HG (ref 35–45)
PH BLDA: 7.46 PH UNITS (ref 7.35–7.45)
PH, TEMP CORRECTED: 7.46 PH UNITS
PLATELET # BLD AUTO: 160 10*3/MM3 (ref 140–450)
PMV BLD AUTO: 11.2 FL (ref 6–12)
PO2 BLDA: 75.2 MM HG (ref 83–108)
PO2 TEMP ADJ BLD: 75.2 MM HG (ref 83–108)
POTASSIUM SERPL-SCNC: 3.9 MMOL/L (ref 3.5–5.2)
PROCALCITONIN SERPL-MCNC: 0.07 NG/ML (ref 0–0.25)
PROT SERPL-MCNC: 6.7 G/DL (ref 6–8.5)
RBC # BLD AUTO: 4.56 10*6/MM3 (ref 3.77–5.28)
SODIUM SERPL-SCNC: 139 MMOL/L (ref 136–145)
TOTAL RATE: 0 BREATHS/MINUTE
WBC NRBC COR # BLD AUTO: 9.44 10*3/MM3 (ref 3.4–10.8)

## 2025-02-13 PROCEDURE — 83050 HGB METHEMOGLOBIN QUAN: CPT

## 2025-02-13 PROCEDURE — 80053 COMPREHEN METABOLIC PANEL: CPT | Performed by: INTERNAL MEDICINE

## 2025-02-13 PROCEDURE — 92526 ORAL FUNCTION THERAPY: CPT

## 2025-02-13 PROCEDURE — 25010000002 HEPARIN (PORCINE) PER 1000 UNITS

## 2025-02-13 PROCEDURE — 87040 BLOOD CULTURE FOR BACTERIA: CPT | Performed by: NURSE PRACTITIONER

## 2025-02-13 PROCEDURE — 25010000002 AZITHROMYCIN PER 500 MG: Performed by: NURSE PRACTITIONER

## 2025-02-13 PROCEDURE — 99232 SBSQ HOSP IP/OBS MODERATE 35: CPT | Performed by: INTERNAL MEDICINE

## 2025-02-13 PROCEDURE — 36600 WITHDRAWAL OF ARTERIAL BLOOD: CPT

## 2025-02-13 PROCEDURE — 94799 UNLISTED PULMONARY SVC/PX: CPT

## 2025-02-13 PROCEDURE — 82375 ASSAY CARBOXYHB QUANT: CPT

## 2025-02-13 PROCEDURE — 25810000003 SODIUM CHLORIDE 0.9 % SOLUTION 250 ML FLEX CONT: Performed by: NURSE PRACTITIONER

## 2025-02-13 PROCEDURE — 25010000002 METHYLPREDNISOLONE PER 125 MG: Performed by: NURSE PRACTITIONER

## 2025-02-13 PROCEDURE — 25010000002 CEFTRIAXONE PER 250 MG: Performed by: NURSE PRACTITIONER

## 2025-02-13 PROCEDURE — 85025 COMPLETE CBC W/AUTO DIFF WBC: CPT | Performed by: INTERNAL MEDICINE

## 2025-02-13 PROCEDURE — 82805 BLOOD GASES W/O2 SATURATION: CPT

## 2025-02-13 PROCEDURE — 84145 PROCALCITONIN (PCT): CPT | Performed by: NURSE PRACTITIONER

## 2025-02-13 PROCEDURE — 83605 ASSAY OF LACTIC ACID: CPT | Performed by: NURSE PRACTITIONER

## 2025-02-13 RX ORDER — METHYLPREDNISOLONE SODIUM SUCCINATE 125 MG/2ML
60 INJECTION, POWDER, LYOPHILIZED, FOR SOLUTION INTRAMUSCULAR; INTRAVENOUS EVERY 8 HOURS
Status: DISCONTINUED | OUTPATIENT
Start: 2025-02-13 | End: 2025-02-16

## 2025-02-13 RX ORDER — METHYLPREDNISOLONE SODIUM SUCCINATE 125 MG/2ML
125 INJECTION, POWDER, LYOPHILIZED, FOR SOLUTION INTRAMUSCULAR; INTRAVENOUS ONCE
Status: DISCONTINUED | OUTPATIENT
Start: 2025-02-13 | End: 2025-02-13

## 2025-02-13 RX ORDER — IPRATROPIUM BROMIDE AND ALBUTEROL SULFATE 2.5; .5 MG/3ML; MG/3ML
3 SOLUTION RESPIRATORY (INHALATION) EVERY 4 HOURS PRN
Status: DISCONTINUED | OUTPATIENT
Start: 2025-02-13 | End: 2025-02-17 | Stop reason: HOSPADM

## 2025-02-13 RX ORDER — IPRATROPIUM BROMIDE AND ALBUTEROL SULFATE 2.5; .5 MG/3ML; MG/3ML
3 SOLUTION RESPIRATORY (INHALATION)
Status: DISCONTINUED | OUTPATIENT
Start: 2025-02-13 | End: 2025-02-17 | Stop reason: HOSPADM

## 2025-02-13 RX ORDER — METHYLPREDNISOLONE SODIUM SUCCINATE 125 MG/2ML
125 INJECTION, POWDER, LYOPHILIZED, FOR SOLUTION INTRAMUSCULAR; INTRAVENOUS ONCE
Status: COMPLETED | OUTPATIENT
Start: 2025-02-13 | End: 2025-02-13

## 2025-02-13 RX ADMIN — METHYLPREDNISOLONE SODIUM SUCCINATE 125 MG: 125 INJECTION INTRAMUSCULAR; INTRAVENOUS at 02:10

## 2025-02-13 RX ADMIN — ATORVASTATIN CALCIUM 20 MG: 20 TABLET, FILM COATED ORAL at 20:44

## 2025-02-13 RX ADMIN — AMLODIPINE BESYLATE 2.5 MG: 2.5 TABLET ORAL at 08:29

## 2025-02-13 RX ADMIN — AZITHROMYCIN DIHYDRATE 500 MG: 500 INJECTION, POWDER, LYOPHILIZED, FOR SOLUTION INTRAVENOUS at 02:19

## 2025-02-13 RX ADMIN — FUROSEMIDE 20 MG: 20 TABLET ORAL at 08:30

## 2025-02-13 RX ADMIN — HEPARIN SODIUM 5000 UNITS: 5000 INJECTION INTRAVENOUS; SUBCUTANEOUS at 08:31

## 2025-02-13 RX ADMIN — METHYLPREDNISOLONE SODIUM SUCCINATE 60 MG: 125 INJECTION INTRAMUSCULAR; INTRAVENOUS at 08:30

## 2025-02-13 RX ADMIN — Medication 10 ML: at 20:45

## 2025-02-13 RX ADMIN — Medication 5 MG: at 20:44

## 2025-02-13 RX ADMIN — DONEPEZIL HYDROCHLORIDE 5 MG: 5 TABLET ORAL at 20:44

## 2025-02-13 RX ADMIN — IPRATROPIUM BROMIDE AND ALBUTEROL SULFATE 3 ML: 2.5; .5 SOLUTION RESPIRATORY (INHALATION) at 20:18

## 2025-02-13 RX ADMIN — OSELTAMIVIR PHOSPHATE 30 MG: 30 CAPSULE ORAL at 12:36

## 2025-02-13 RX ADMIN — PHENOL 1 SPRAY: 1.5 LIQUID ORAL at 20:50

## 2025-02-13 RX ADMIN — METHYLPREDNISOLONE SODIUM SUCCINATE 60 MG: 125 INJECTION INTRAMUSCULAR; INTRAVENOUS at 16:41

## 2025-02-13 RX ADMIN — GUAIFENESIN 600 MG: 600 TABLET ORAL at 08:29

## 2025-02-13 RX ADMIN — AMLODIPINE BESYLATE 2.5 MG: 2.5 TABLET ORAL at 20:44

## 2025-02-13 RX ADMIN — GUAIFENESIN 600 MG: 600 TABLET ORAL at 20:44

## 2025-02-13 RX ADMIN — PHENOL 1 SPRAY: 1.5 LIQUID ORAL at 08:32

## 2025-02-13 RX ADMIN — HEPARIN SODIUM 5000 UNITS: 5000 INJECTION INTRAVENOUS; SUBCUTANEOUS at 20:45

## 2025-02-13 RX ADMIN — SODIUM CHLORIDE 1000 MG: 900 INJECTION INTRAVENOUS at 03:54

## 2025-02-13 RX ADMIN — Medication 10 ML: at 08:33

## 2025-02-13 RX ADMIN — PANTOPRAZOLE SODIUM 40 MG: 40 TABLET, DELAYED RELEASE ORAL at 08:29

## 2025-02-13 RX ADMIN — OSELTAMIVIR PHOSPHATE 30 MG: 30 CAPSULE ORAL at 20:53

## 2025-02-13 NOTE — PLAN OF CARE
Problem: Adult Inpatient Plan of Care  Goal: Plan of Care Review  Outcome: Progressing  Goal: Patient-Specific Goal (Individualized)  Outcome: Progressing  Goal: Absence of Hospital-Acquired Illness or Injury  Outcome: Progressing  Intervention: Identify and Manage Fall Risk  Intervention: Prevent Skin Injury  Intervention: Prevent Infection  Goal: Optimal Comfort and Wellbeing  Outcome: Progressing  Goal: Readiness for Transition of Care  Outcome: Progressing     Problem: Violence Risk or Actual  Goal: Anger and Impulse Control  Outcome: Progressing  Intervention: Minimize Safety Risk     Problem: Fall Injury Risk  Goal: Absence of Fall and Fall-Related Injury  Outcome: Progressing  Intervention: Identify and Manage Contributors  Intervention: Promote Injury-Free Environment     Problem: Skin Injury Risk Increased  Goal: Skin Health and Integrity  Outcome: Progressing  Intervention: Optimize Skin Protection     Problem: Comorbidity Management  Goal: Maintenance of COPD Symptom Control  Outcome: Progressing  Intervention: Maintain COPD (Chronic Obstructive Pulmonary Disease) Symptom Control  Goal: Maintenance of Osteoarthritis Symptom Control  Outcome: Progressing  Intervention: Maintain Osteoarthritis Symptom Control     Problem: Pulmonary Impairment  Goal: Improved Activity Tolerance  Outcome: Progressing  Goal: Effective Airway Clearance  Outcome: Progressing  Goal: Optimal Gas Exchange  Outcome: Progressing     Problem: Noninvasive Ventilation Acute  Goal: Effective Unassisted Ventilation and Oxygenation  Outcome: Progressing   Goal Outcome Evaluation:      Pt alert to self only. VSS. Pt remains on high flow via NC. Pt maintaining saturation between 93-96% NSR on tele. UOP adequate. Plan of care ongoing.

## 2025-02-13 NOTE — THERAPY TREATMENT NOTE
Acute Care - Speech Language Pathology   Swallow Treatment Note [facility]     Patient Name: Ashley Younger  : 1940  MRN: [id number]  Today's Date: 2025               Admit Date: 2025    Visit Dx:     ICD-10-CM ICD-9-CM   1. Hypoxia  R09.02 799.02   2. Influenza A  J10.1 487.1   3. Acute UTI (urinary tract infection)  N39.0 599.0   4. Oropharyngeal dysphagia  R13.12 787.22     Patient Active Problem List   Diagnosis    Common bile duct (CBD) obstruction    Hypertension    Sepsis    Fracture of left shoulder girdle, part unspecified, subsequent encounter for fracture with routine healing    Muscle weakness (generalized)    Urinary tract infection, site not specified    Wedge compression fracture of t5-T6 vertebra, subsequent encounter for fracture with routine healing    Encephalopathy, unspecified    Fall at home    Dehydration    Cholangitis    Influenza    Hypoxia     Past Medical History:   Diagnosis Date    BENJIE (acute kidney injury)     Arthritis     Cholelithiasis     Chronic kidney disease, stage 3 unspecified     DJD (degenerative joint disease)     Encounter for removal of sutures     GERD (gastroesophageal reflux disease)     Headache     History of  brain aneurysm     Hyperlipidemia     Hypertension     Orthostatic hypotension     Osteoarthritis     Presence of neurostimulator     Recurrent falls     Renal insufficiency     Scalp laceration     Urinary tract infection      Past Surgical History:   Procedure Laterality Date    CHOLECYSTECTOMY      CRANIAL NEUROSTIMULATOR INSERTION/REPLACEMENT      TUMOR    ERCP      WITH STONE REMOVED    ORIF WRIST FRACTURE Left        SLP Recommendation and Plan     SLP Diet Recommendation:  (Safest rec NPO vs PO diet per MD discretion. If cont PO diet, rec mechanical ground solid diet w/ HTL) (25 1335)  Recommended Precautions and Strategies: general aspiration precautions, assist with feeding, no straw, alternate between small bites of food and  sips of liquid, other (see comments) (cue pt for throat clear + extra intermittently) (02/13/25 1335)  SLP Rec. for Method of Medication Administration: meds whole, meds crushed, with puree, as tolerated (02/13/25 1335)     Monitor for Signs of Aspiration: yes, notify SLP if any concerns (02/13/25 1335)        Anticipated Discharge Disposition (SLP): skilled nursing facility (02/13/25 1335)     Therapy Frequency (Swallow): 5 days per week (02/13/25 1335)  Predicted Duration Therapy Intervention (Days): 1 week (02/13/25 1335)  Oral Care Recommendations: Oral Care BID/PRN, Suction toothbrush (02/13/25 1335)        Daily Summary of Progress (SLP): progress toward functional goals as expected (02/13/25 1335)               Treatment Assessment (SLP): suspected, continued, pharyngeal dysphagia (02/13/25 1335)  Treatment Assessment Comments (SLP): Pt w/ increased O2 requirement, currently on HFNC. Repeat CXR w/o significant changes from prior. Pt w/ cough t/o session. Cough did not appear directly r/t PO, however difficult to determine. Reviewed & completed all exercises, pt w/ difficulty completing exercises requiring multiple steps. Per chart, aspiration of  pudding/HTL was not observed during [facility] 2/12; however, pt felt to be @ risk over course of a meal. Given respiratory status + increased O2 requirement, pt felt to be @ risk of asopiration w/ all PO intake. MD made aware, GOC/POC ongoing (02/13/25 1335)  Plan for Continued Treatment (SLP): continue treatment per plan of care (02/13/25 1335)                SWALLOW EVALUATION (Last 72 Hours)       SLP Adult Swallow Evaluation       Row Name 02/13/25 1335 02/12/25 1405 02/12/25 1010             Rehab Evaluation    Document Type therapy note (daily note)  -MH evaluation  - evaluation  -      Subjective Information no complaints  - no complaints  - no complaints  -      Patient Observations alert;cooperative  - alert;cooperative  - alert;cooperative  -       "Patient/Family/Caregiver Comments/Observations No family present  - no family present  - family present  -      Patient Effort good  - good  - good  -      Symptoms Noted During/After Treatment none  - none  - none  -         General Information    Patient Profile Reviewed -- yes  - yes  -      Pertinent History Of Current Problem -- See initial eval, pt referred for MBS  - Adm w/ hypoxia, flu A + on arrival. Hx: dementia, GERD, HTN, HLD. CXR w/o evidence of acute cardiopulmonary findings  -      Current Method of Nutrition -- mechanical ground textures;thin liquids  - NPO  -      Precautions/Limitations, Vision -- WFL;for purposes of eval  [facility] WFL;for purposes of eval  -      Precautions/Limitations, Hearing -- WFL;for purposes of eval  [facility] WFL;for purposes of [facility]      Prior Level of Function-Communication -- other (see comments)  dementia per chart  - other (see comments)  dementia per chart  -      Prior Level of Function-Swallowing -- other (see comments)  MBS completed @ [facility] (October '23) w/ recs for regular solid diet w/ HTL. Repeat MBS (October '23) w/ recs for soft solid diet w/ NTL. Family reports pt has since been on \"soft\" solid diet w/ thin liquids.  - other (see comments)  MBS completed @ [facility] (October '23) w/ recs for regular solid diet w/ HTL. Repeat MBS (October '23) w/ recs for soft solid diet w/ NTL. Family reports pt has since been on \"soft\" solid diet w/ thin liquids.  -      Plans/Goals Discussed with -- patient;agreed upon  - patient;family;agreed upon  -      Barriers to Rehab -- none identified  - none identified  -      Patient's Goals for Discharge -- patient did not state  - patient did not state  -      Family Goals for Discharge -- -- family did not state  -         Pain    Additional Documentation Pain Scale: FACES Pre/Post-Treatment (Group)  - Pain Scale: FACES Pre/Post-Treatment (Group)  - Pain Scale: FACES " "Pre/Post-Treatment (Group)  -         Pain Scale: FACES Pre/Post-Treatment    Pain: FACES Scale, Pretreatment 0-->no hurt  - 0-->no hurt  - 0-->no hurt  -      Posttreatment Pain Rating 0-->no hurt  - 0-->no hurt  - 0-->no hurt  -         Oral Motor Structure and Function    Secretion Management -- -- WNL/WFL  -      Mucosal Quality -- -- moist, healthy  -         Oral Musculature and Cranial Nerve Assessment    Oral Motor General Assessment -- -- generalized oral motor weakness  -         General Eating/Swallowing Observations    Respiratory Support Currently in Use -- -- nasal cannula  -      O2 Liters -- -- 6L  -      Eating/Swallowing Skills -- -- self-fed;fed by SLP  -      Positioning During Eating -- -- upright 90 degree;upright in chair  -      Utensils Used -- -- spoon;cup;straw  -      Consistencies Trialed -- -- ice chips;thin liquids;pureed;regular textures  -         Clinical Swallow Eval    Oral Prep Phase -- -- impaired  -      Oral Residue -- -- impaired  -      Clinical Swallow Evaluation Summary -- -- Pt w/ intermittent cough t/o eval & prior to PO trials. Immediate cough w/ thins via straw only. No additional s/s of aspiration accross trials of thin via cup, pureed, or w/ regular solid consistenices. Oral phase generally weak appearing. Pt w/ prolonged mastication & residue w/ regular solid trial. Family reports pt is \"soft\" solid diet w/ thin liquids @ baseline. Recommend mechanical ground solid diet w/ thin liquids, no straw.  Plan for [facility] this PM  -         Oral Prep Concerns    Oral Prep Concerns -- -- prolonged mastication  -      Prolonged Mastication -- -- regular consistencies  [facility]         Oral Residue Concerns    Oral Residue Concerns -- -- diffuse residue throughout oral cavity  -      Diffuse Residue Throughout Oral Cavity -- -- regular consistencies  -         MBS/VFSS    Utensils Used -- cup;spoon;straw  - --      Consistencies Trialed -- " "thin liquids;nectar/syrup-thick liquids;honey-thick liquids;pudding thick;regular textures  - --         MBS/VFSS Interpretation    Oral Prep Phase -- impaired oral phase of swallowing  - --      Oral Transit Phase -- impaired  - --      Oral Residue -- impaired  - --      VFSS Summary -- Moderate oral and moderate to severe pharyngeal dysphagia. Deep penetration w/ thins before, during & after the swallow. Penetration w/ NTL during the swallow w/ continued penetration after the swallow. Diffuse residue w/ all consistenices assessed, residue worse w/ pudding & regular solid consistenices. Penetration w/ pudding when mixed w/ residue from previous trial that pt was u/a to fully clear. Penetration w/ HTL/residue. No aspiration directly observed, however pt felt to be @ general risk of aspiration w/ all PO intake @ this time. Discussed results & recommendations w/ MD, ok'toney \"safest\" PO diet option. If cont PO diet, recommend mechanical ground solid diet w/ HTL, no straw, assist w/ feeding, cue pt for throat clear + extra intermittently, alternate bites/sips. SLP will cont to f/u for tx  - --         Oral Preparatory Phase    Oral Preparatory Phase -- reduced lip closure around utensil;anterior loss;prolonged manipulation  - --      Reduced Lip Closure Around Utensil -- thin liquids;nectar-thick liquids;secondary to impaired cognitive status;secondary to reduced lingual range of motion;secondary to reduced lingual strength  - --      Anterior Loss -- thin liquids;secondary to reduced lingual range of motion;secondary to reduced lingual strength;secondary to reduced labial seal;secondary to impaired cognitive status  - --      Prolonged Manipulation -- pudding/puree;regular textures;secondary to reduced lingual range of motion;secondary to impaired cognitive status;secondary to reduced lingual strength;secondary to reduced labial seal  - --         Oral Transit Phase    Impaired Oral Transit Phase -- " increased A-P transit time;premature spillage of liquids into pharynx  - --      Increased A-P Transit Time -- all consistencies tested;secondary to impaired cognitive status;discoordination of lingual movement;secondary to reduced lingual control  - --      Premature Spillage of Liquids into Pharynx -- thin liquids;nectar-thick liquids;discoordination of lingual movement;secondary to impaired cognitive status;secondary to reduced lingual control  - --         Oral Residue    Impaired Oral Residue -- diffuse residue throughout oral cavity  - --      Diffuse Residue throughout Oral Cavity -- all consistencies tested;secondary to reduced lingual range of motion;secondary to reduced lingual strength  - --      Response to Oral Residue -- partial residue clearance;with spontaneous subsequent swallow  - --         Initiation of Pharyngeal Swallow    Initiation of Pharyngeal Swallow -- bolus in pyriform sinuses  - --      Pharyngeal Phase -- impaired pharyngeal phase of swallowing  - --      Anatomical abnormalities noted -- osteophyte/bone spur per radiology report;reverse lordotic c-spine curvature;hyperlordotic c-spine curvature  - --      Anatomical abnormalities functional impact -- functional impact on swallowing;other (see comments)  contributes to residue  - --      Penetration Before the Swallow -- thin liquids;secondary to delayed swallow initiation or mistiming;secondary to reduced back of tongue control  - --      Penetration During the Swallow -- thin liquids;nectar-thick liquids;honey-thick liquids;pudding/puree;secondary to delayed swallow initiation or mistiming;secondary to reduced laryngeal elevation;secondary to reduced vestibular closure  - --      Penetration After the Swallow -- thin liquids;nectar-thick liquids;secondary to residue;in valleculae;in pyriform sinuses  - --      Depth of Penetration -- deep  -MH --      Response to Penetration -- No  -MH --      No spontaneous  response to penetration and -- non-effective laryngeal clearance with cue (see comments);other (see comments)  cued cough  - --      Rosenbek's Scale -- thin:;nectar:;3--->level 3;honey:;pudding/puree:;2--->level 2  - --      Pharyngeal Residue -- all consistencies tested;diffuse within pharynx;secondary to reduced hyolaryngeal excursion;secondary to reduced laryngeal elevation;secondary to reduced posterior pharyngeal wall stripping;secondary to reduced base of tongue retraction  - --      Response to Residue -- unable to clear residue;cleared residue with spontaneous subsequent swallow;cleared residue with cued swallow  - --      Attempted Compensatory Maneuvers -- bolus size;bolus presentation style;additional subsequent swallow;multiple swallows  - --      Response to Attempted Compensatory Maneuvers -- did not prevent penetration  - --      Successful Compensatory Maneuver Competency -- patient able to;demonstrate compensations;with cues  - --         SLP Evaluation Clinical Impression    SLP Swallowing Diagnosis -- moderate;oral dysphagia;mod-severe;pharyngeal dysphagia  - oral dysphagia;R/O pharyngeal dysphagia  -      Functional Impact -- risk of aspiration/pneumonia  - risk of aspiration/pneumonia  -      Rehab Potential/Prognosis, Swallowing -- good, to achieve stated therapy goals  - good, to achieve stated therapy goals  -      Swallow Criteria for Skilled Therapeutic Interventions Met -- demonstrates skilled criteria  - demonstrates skilled criteria  -         SLP Treatment Clinical Impressions    Treatment Assessment (SLP) suspected;continued;pharyngeal dysphagia  - -- --      Treatment Assessment Comments (SLP) Pt w/ increased O2 requirement, currently on HFNC. Repeat CXR w/o significant changes from prior. Pt w/ cough t/o session. Cough did not appear directly r/t PO, however difficult to determine. Reviewed & completed all exercises, pt w/ difficulty completing  exercises requiring multiple steps. Per chart, aspiration of  pudding/HTL was not observed during [facility] 2/12; however, pt felt to be @ risk over course of a meal. Given respiratory status + increased O2 requirement, pt felt to be @ risk of asopiration w/ all PO intake. MD made aware, GOC/POC ongoing  - -- --      Daily Summary of Progress (SLP) progress toward functional goals as expected  - -- --      Plan for Continued Treatment (SLP) continue treatment per plan of care  - -- --      Care Plan Review care plan/treatment goals reviewed  - -- --         Recommendations    Therapy Frequency (Swallow) 5 days per week  - 5 days per week  - --      Predicted Duration Therapy Intervention (Days) 1 week  - 1 week  - 1 week  -      SLP Diet Recommendation --  Safest rec NPO vs PO diet per MD discretion. If cont PO diet, rec mechanical ground solid diet w/ HTL  - mechanical ground textures;honey thick liquids;no mixed consistencies  - mechanical ground textures;thin liquids  -      Recommended Diagnostics -- -- reassess via VFSS ([facility])  -      Recommended Precautions and Strategies general aspiration precautions;assist with feeding;no straw;alternate between small bites of food and sips of liquid;other (see comments)  cue pt for throat clear + extra intermittently  - general aspiration precautions;assist with feeding;no straw;alternate between small bites of food and sips of liquid  cue pt for throat clear + extra intermittently  - general aspiration precautions;no straw  -      Oral Care Recommendations Oral Care BID/PRN;Suction toothbrush  - Oral Care BID/PRN;Suction toothbrush  - Oral Care BID/PRN;Toothbrush  -      SLP Rec. for Method of Medication Administration meds whole;meds crushed;with puree;as tolerated  - meds whole;meds crushed;with puree;as tolerated  - meds whole;meds crushed;with puree;as tolerated  -      Monitor for Signs of Aspiration yes;notify SLP if any concerns   - yes;notify SLP if any concerns  - yes;notify SLP if any concerns  -      Anticipated Discharge Disposition (SLP) skilled nursing facility  - skilled nursing facility  - skilled nursing facility  -                User Key  (r) = Recorded By, (t) = Taken By, (c) = Cosigned By      Initials Name Effective Dates     Mikaela Guzman JULY, MS [facility]SLP 05/12/23 -                     EDUCATION  The patient has been educated in the following areas:   Dysphagia (Swallowing Impairment) Oral Care/Hydration Modified Diet Instruction.        SLP GOALS       Row Name 02/13/25 1335 02/12/25 1405          (LTG) Patient will demonstrate functional swallow for    Diet Texture (Demonstrate functional swallow) regular textures  - regular textures  -     Liquid viscosity (Demonstrate functional swallow) thin liquids  - thin liquids  -     [place] (Demonstrate functional swallow) with minimal cues (75-90% accuracy)  - with minimal cues (75-90% accuracy)  -     Time Frame (Demonstrate functional swallow) 1 week  - 1 week  -     Progress/Outcomes (Demonstrate functional swallow) continuing progress toward goal  - new goal  -        (STG) Patient will tolerate trials of    Consistencies Trialed (Tolerate trials) mechanical ground textures;honey/ moderately thick liquids  - mechanical ground textures;honey/ moderately thick liquids  -     Desired Outcome (Tolerate trials) without signs/symptoms of aspiration;with adequate oral prep/transit/clearance;with use of compensatory strategies (see comments)  no straw, assist w/ feeding, cue pt for throat clear + extra intermittently, alternate bites/sips  - without signs/symptoms of aspiration;with adequate oral prep/transit/clearance;with use of compensatory strategies (see comments)  no straw, assist w/ feeding, cue pt for throat clear + extra intermittently, alternate bites/sips  -     [place] (Tolerate trials) with 1:1 assist/ supervision  -  with 1:1 assist/ supervision  -     Time Frame (Tolerate trials) 1 week  - 1 week  -     Progress/Outcomes (Tolerate trials) continuing progress toward goal  - new goal  -     Comment (Tolerate trials) Pt w/ intermittent cough t/o session, cough did not appear to worsen or become more frequent w/ PO  - --        (STG) Lingual Strengthening Goal 1 (SLP)    Activity (Lingual Strengthening Goal 1, SLP) increase lingual tone/sensation/control/coordination/movement;increase tongue back strength  - increase lingual tone/sensation/control/coordination/movement;increase tongue back strength  -     Increase Lingual Tone/Sensation/Control/Coordination/Movement lingual resistance exercises;swallow trials  - lingual resistance exercises;swallow trials  -     Increase Tongue Back Strength lingual resistance exercises  - lingual resistance exercises  -     [place]/Accuracy (Lingual Strengthening Goal 1, SLP) with moderate cues (50-74% accuracy)  - with moderate cues (50-74% accuracy)  -     Time Frame (Lingual Strengthening Goal 1, SLP) 1 week  - 1 week  -     Progress/Outcomes (Lingual Strengthening Goal 1, SLP) continuing progress toward goal  - new goal  -        (STG) Pharyngeal Strengthening Exercise Goal 1 (SLP)    Activity (Pharyngeal Strengthening Goal 1, SLP) increase timing;increase superior movement of the hyolaryngeal complex;increase anterior movement of the hyolaryngeal complex;increase closure at entrance to airway/closure of airway at glottis;increase squeeze/positive pressure generation  - increase timing;increase superior movement of the hyolaryngeal complex;increase anterior movement of the hyolaryngeal complex;increase closure at entrance to airway/closure of airway at glottis;increase squeeze/positive pressure generation  -     Increase Timing prepping - 3 second prep or suck swallow or 3-step swallow  - prepping - 3 second prep or suck swallow or 3-step swallow   -MH     Increase Superior Movement of the Hyolaryngeal Complex effortful pitch glide (falsetto + pharyngeal squeeze)  -MH effortful pitch glide (falsetto + pharyngeal squeeze)  -MH     Increase Anterior Movement of the Hyolaryngeal Complex chin tuck against resistance (CTAR)  - chin tuck against resistance (CTAR)  -MH     Increase Closure at Entrance to Airway/Closure of Airway at Glottis supraglottic swallow  - supraglottic swallow  -     Increase Squeeze/Positive Pressure Generation hard effortful swallow  - hard effortful swallow  -     [place]/Accuracy (Pharyngeal Strengthening Goal 1, SLP) with moderate cues (50-74% accuracy)  - with moderate cues (50-74% accuracy)  -     Time Frame (Pharyngeal Strengthening Goal 1, SLP) 1 week  - 1 week  -     Progress/Outcomes (Pharyngeal Strengthening Goal 1, SLP) continuing progress toward goal  - new goal  -     Comment (Pharyngeal Strengthening Goal 1, SLP) Focsued on CTAR & hard effortful swallow, pt w/ difficulty completing exercises requiring multiple steps  - --               User Key  (r) = Recorded By, (t) = Taken By, (c) = Cosigned By      Initials Name Provider Type     Mikaela Guzman, MS CCC-SLP Speech and Language Pathologist                         Time Calculation:    Time Calculation- SLP       Row Name 02/13/25 1422             Time Calculation- SLP    SLP Start Time 1335  -      SLP Received On 02/13/25  -         Untimed Charges    33285-WR Treatment Swallow Minutes 41  -         Total Minutes    Untimed Charges Total Minutes 41  -       Total Minutes 41  -MH                User Key  (r) = Recorded By, (t) = Taken By, (c) = Cosigned By      Initials Name Provider Type     Mikaela Guzman, MS CCC-SLP Speech and Language Pathologist                    Therapy Charges for Today       Code Description Service Date Service Provider Modifiers Qty    39600509070 HC ST EVAL ORAL PHARYNG SWALLOW 3 2/12/2025 Thomas  Mikaela MCDOWELL, MS MALIN-SLP GN 1    94222475721 HC ST MOTION FLUORO EVAL SWALLOW 4 2/12/2025 Mikaela Guzman, MS CCC-SLP GN 1    [id number] HC ST TREATMENT SWALLOW 3 2/13/2025 Mikaela Guzman, MS SANTOSSLP GN 1                 MS FLOWER Benavides  2/13/2025

## 2025-02-13 NOTE — PLAN OF CARE
Goal Outcome Evaluation:                   Anticipated Discharge Disposition (SLP): skilled nursing facility

## 2025-02-13 NOTE — CASE MANAGEMENT/SOCIAL WORK
Continued Stay Note  [facility]     Patient Name: Ashley Younger  MRN: [id number]  Today's Date: 2/13/2025    Admit Date: 2/11/2025    Plan: [facility]   Discharge Plan       Row Name 02/13/25 1127       Plan    Plan [facility]    Plan Comments I spoke with patient's daughter, Lilliana in regards to discharge planning. She tells me patient is a long term resident of [facility], has been there about a year and a half. She is mobile with wheelchair, scoots with her feet. SHe has Medicare.  Her PCP is John Chen. Lilliana is POA. Patient may end up returning to skilled when going back to [facility]    Final Discharge Disposition Code 03 - skilled nursing facility (SNF)                   Discharge Codes    No documentation.                 Expected Discharge Date and Time       Expected Discharge Date Expected Discharge Time    Feb 13, 2025               Renetta Chi RN

## 2025-02-13 NOTE — PROGRESS NOTES
[facility] Medicine Services  PROGRESS NOTE    Patient Name: Ashley Younger  : 1940  MRN: [id number]    Date of Admission: 2025  Primary Care Physician: John Chen MD    Subjective   Subjective     CC:  Influenza/hypoxia    HPI:  On HFNC- oxygen requirements have escalated since admission. No family present, gives me permission to call her dtr Lilliana. No real complaints at this time and reports she is comfortable on hfnc.     Ros   As above      Objective   Objective     Vital Signs:   Temp:  [97.6 °F (36.4 °C)-98.6 °F (37 °C)] 98.6 °F (37 °C)  Heart Rate:  [75-86] 77  Resp:  [18] 18  BP: (106-119)/(53-84) 114/84  Flow (L/min) (Oxygen Therapy):  [6-50] 50     Physical Exam:  GEN: NAD, resting in bed, awake  HEENT: on HFNC, resting in bed   NECK: supple, no masses  RESP: on HFNC, coarse bs  CV: on tele, sinus rhythm  PSYCH: normal affect, appropriate  NEURO: awake, alert, no focal deficits noted  MSK: no edema noted  SKIN: no rashes noted       Results Reviewed:  LAB RESULTS:      Lab 25  0834 25  1148   WBC 9.44 6.35 4.84   HEMOGLOBIN 14.2 13.5 13.2   HEMATOCRIT 45.4 43.4 42.2   PLATELETS 160 165 164   NEUTROS ABS 6.66 3.79 2.63   IMMATURE GRANS (ABS) 0.02 0.02 0.01   LYMPHS ABS 1.83 1.50 1.27   MONOS ABS 0.90 0.99* 0.89   EOS ABS 0.01 0.03 0.02   MCV 99.6* 99.5* 99.1*   PROCALCITONIN 0.07  --   --    LACTATE 1.5  --  0.7         Lab 25  0834 25  1148   SODIUM 139 142 141   POTASSIUM 3.9 4.0 3.9   CHLORIDE 99 100 103   CO2 26.0 31.0* 30.0*   ANION GAP 14.0 11.0 8.0   BUN 23 21 18   CREATININE 0.86 0.94 0.75   EGFR 67.5 60.7 79.6   GLUCOSE 109* 89 104*   CALCIUM 8.3* 8.2* 8.3*   MAGNESIUM  --  1.9 1.9   PHOSPHORUS  --  3.8  --          Lab 25  0127 25  0834 25  1148   TOTAL PROTEIN 6.7 6.5 6.7   ALBUMIN 3.6 3.8 3.9   GLOBULIN 3.1 2.7 2.8   ALT (SGPT) 9 12 9   AST (SGOT) 25 30 31   BILIRUBIN 0.5  0.5 0.4   ALK PHOS 73 72 76                     Lab 02/13/25  0128   PH, ARTERIAL 7.456*   PCO2, ARTERIAL 46.7*   PO2 ART 75.2*   FIO2 90   HCO3 ART 32.9*   BASE EXCESS ART 7.8*   CARBOXYHEMOGLOBIN 0.8     Brief Urine Lab Results  (Last result in the past 365 days)        Color   Clarity   Blood   Leuk Est   Nitrite   Protein   CREAT   Urine HCG        02/12/25 1558 Dark Yellow   Turbid   Large (3+)   Moderate (2+)   Positive   Trace                   Microbiology Results Abnormal       Procedure Component Value - Date/Time    COVID PRE-OP / PRE-PROCEDURE SCREENING ORDER (NO ISOLATION) - Swab, Nasopharynx [[id number]]  (Abnormal) Collected: 02/11/25 1128    Lab Status: Final result Specimen: Swab from Nasopharynx Updated: 02/11/25 1346    Narrative:      The following orders were created for panel order COVID PRE-OP / PRE-PROCEDURE SCREENING ORDER (NO ISOLATION) - Swab, Nasopharynx.  Procedure                               Abnormality         Status                     ---------                               -----------         ------                     COVID-19, FLU A/B, RSV P...[[id number]]  Abnormal            Final result                 Please view results for these tests on the individual orders.    COVID-19, FLU A/B, RSV PCR 1 HR TAT - Swab, Nasopharynx [[id number]]  (Abnormal) Collected: 02/11/25 1128    Lab Status: Final result Specimen: Swab from Nasopharynx Updated: 02/11/25 1346     COVID19 Not Detected     Influenza A PCR Detected     Influenza B PCR Not Detected     RSV, PCR Not Detected    Narrative:      Fact sheet for providers: https://www.fda.gov/media/273259/download    Fact sheet for patients: https://www.fda.gov/media/475363/download    Test performed by PCR.            XR Chest 1 View    Result Date: 2/12/2025  XR CHEST 1 VW Date of Exam: 2/12/2025 10:30 PM EST Indication: hypoxia Comparison: 2/12/2025 Findings: Cardiomediastinal silhouette similar to prior examination. Patient is rotated to the  right. Persistent right basal airspace disease and small bilateral pleural effusions. No acute osseous abnormality identified.     Impression: Impression: No significant change Electronically Signed: Mitchell Leary MD  2/12/2025 10:44 PM EST  Workstation ID: [id number]    FL Video Swallow With Speech Single Contrast    Result Date: 2/12/2025  FL VIDEO SWALLOW W SPEECH SINGLE-CONTRAST Date of Exam: 2/12/2025 2:11 PM EST Indication: dysphagia.   Comparison: None available. Technique:   The speech pathologist administered food and/or liquid mixed with barium to the patient with cine/video imaging.  Imaging assistance was provided to the speech pathologist and an image was saved. Fluoroscopic Time: 3 minutes 36 seconds Number of Images: 7 cine loops Findings: The patient was evaluated in the seated lateral position while taking a variety of consistencies by mouth under the direction of speech pathology. There is consistent deep penetration. No definite aspiration was observed. Please see the speech pathologist's procedure report for full details and recommendations.     Impression: Impression: Fluoroscopy provided during modified barium swallow. Electronically Signed: Manoj Hernandez MD  2/12/2025 2:54 PM EST  Workstation ID: [id number]    XR Chest 1 View    Result Date: 2/12/2025  XR CHEST 1 VW Date of Exam: 2/12/2025 12:06 PM EST Indication: worsening oxygen req, flu Comparison: 2/11/2025 Findings: Patient is again rotated to the right. The heart is borderline enlarged. Vasculature appears normal. Mild generalized coarsening of the pulmonary interstitial markings is unchanged but there is mildly increased patchy opacity in the right lung base, which may represent atelectasis or a small focus of developing pneumonia. There appears to be a small right effusion. No pneumothorax is identified. Deformity of the left proximal humerus is consistent with old healed fracture.     Impression: Impression: 1. Mildly increased patchy  opacity of the right lung base and suspected small effusion, potentially a developing pneumonia. 2. Stable appearance of the chest elsewhere. Electronically Signed: Manoj Hernandez MD  2/12/2025 12:30 PM EST  Workstation ID: [id number]    XR Chest 1 View    Result Date: 2/11/2025  XR CHEST 1 VW Date of Exam: 2/11/2025 11:18 AM EST Indication: productive cough, dementia with aspiration risk Comparison: AP chest 3/18/2025. Findings: Low volume inspiration. No acute airspace disease is seen. Heart size appears borderline prominent, favored to represent accentuated contour related to kyphotic positioning. No pleural effusion or pneumothorax or acute osseous abnormalities are identified. Upper lumbar levoscoliosis. Cholecystectomy.     Impression: Impression: No acute cardiopulmonary findings. Electronically Signed: Maria Del Carmen Zuniga MD  2/11/2025 11:59 AM EST  Workstation ID: [id number]         Current medications:  Scheduled Meds:amLODIPine, 2.5 mg, Oral, BID  atorvastatin, 20 mg, Oral, Nightly  azithromycin, 500 mg, Intravenous, Q24H  cefTRIAXone, 1,000 mg, Intravenous, Q24H  donepezil, 5 mg, Oral, Nightly  furosemide, 20 mg, Oral, Daily  guaiFENesin, 600 mg, Oral, Q12H  heparin (porcine), 5,000 Units, Subcutaneous, Q12H  ipratropium-albuterol, 3 mL, Nebulization, 4x Daily - RT  melatonin, 5 mg, Oral, Nightly  methylPREDNISolone sodium succinate, 60 mg, Intravenous, Q8H  oseltamivir, 30 mg, Oral, Q12H  pantoprazole, 40 mg, Oral, Daily  phenol, 1 spray, Mouth/Throat, BID  sodium chloride, 10 mL, Intravenous, Q12H      Continuous Infusions:Pharmacy to Dose Oseltamivir (TAMIFLU),       PRN Meds:.  acetaminophen **OR** acetaminophen **OR** acetaminophen    senna-docusate sodium **AND** polyethylene glycol **AND** bisacodyl **AND** bisacodyl    Calcium Replacement - Follow Nurse / BPA Driven Protocol    ipratropium-albuterol    Magnesium Standard Dose Replacement - Follow Nurse / BPA Driven Protocol    nitroglycerin    Pharmacy to  Dose Oseltamivir (TAMIFLU)    Phosphorus Replacement - Follow Nurse / BPA Driven Protocol    Potassium Replacement - Follow Nurse / BPA Driven Protocol    sodium chloride    sodium chloride    Assessment & Plan   Assessment & Plan     Active Hospital Problems    Diagnosis  POA    **Influenza [J11.1]  Yes    Hypoxia [R09.02]  Unknown    Urinary tract infection, site not specified [N39.0]  Yes      Resolved Hospital Problems   No resolved problems to display.        Brief Hospital Course to date:  Ashley Younger is a 84 y.o. female with PMH significant for dementia, GERD, HLD, HTN presents to [facility] ED for complaint of hypoxia. Patient experiencing flu-like symptoms x 2 days, found to be Influenza A + on arrival. Discovered have UTI as well.      Influenza A + - POA  Hypoxia  Remote hx of COPD  - Patient began exhibiting flu-like symptoms 2 days prior to admission, patient's daughter reports patient has hx of COPD. Patient was never prescribed supplemental O2 however, she reportedly shared supplemental O2 with her family member  - CXR in ED negative for acute process  - Patient is at an increased risk of serious complications from influenza due to her age (> 68 y/o) and PMH significant for dementia, will initiate on Tamiflu x 5 days  - Currently on HFNC, increased requirements since admission  - started on IV steroids- continue  - patient is DNR/DNI     UTI - POA  - Remote hx of a procedure to reduce occurrence of UTI at [place] per patient's daughter  - U/A in ED + nitrites, trace leuks, 2+ blood, 6-10 WBCs, 4+ bacteria  - Urine cx pending? Though will double check as not sure if it was sent   - has been on rocephin/azithro since admission, continue for now     Dementia  Dysphagia  - Continue Aricept and melatonin  - Modified diet     HTN  - Continue amlodipine and Lasix     HLD  - Continue statin     GERD  - Continue PPI    Updated daughter Anisha to patient's status- she is aware of how sick patient is. Will  continue all measures at this time but she is also in agreement that patient would not ventilation/life supportive measures. Confirmed DNR/DNI    Expected Discharge Location and Transportation: home  Expected Discharge   Expected Discharge Date: 2/13/2025; Expected Discharge Time:      VTE Prophylaxis:  Pharmacologic VTE prophylaxis orders are present.         AM-PAC 6 Clicks Score (PT): 9 (02/13/25 0830)    CODE STATUS:   Code Status and Medical Interventions: No CPR (Do Not Attempt to Resuscitate); Limited Support; No intubation (DNI)   Ordered at: 02/11/25 1713     Medical Intervention Limits:    No intubation (DNI)     Level Of Support Discussed With:    Next of Kin (If No Surrogate)     Code Status (Patient has no pulse and is not breathing):    No CPR (Do Not Attempt to Resuscitate)     Medical Interventions (Patient has pulse or is breathing):    Limited Support       Renetta Ulrich MD  02/13/25

## 2025-02-13 NOTE — PLAN OF CARE
Oriented to self, patient is profoundly confused with impulsiveness, occasional agitation. Lung sound widely diminished. Unable to utilize IS due to cognitive impairment.  Patient was on 6 L ( humidified ) at the beginning of the shift, due to the decreased saturation, currently on high flow per NC. Saturation stabilized in between 90 to 92 %. SR in 70s on cardiac mx. Antibiotic therapy started per order. Denies pain / discomfort at this time. Frequent rounds in place.

## 2025-02-13 NOTE — SIGNIFICANT NOTE
RN called stating patient is now requiring hi flow nasal cannula due to decreased oxygen saturation.  Repeat CXR shows no significant change.  On exam: lung sounds rhonchi with diffuse wheezing.  Patient does not have any tachypnea or look in distress.  She is now maxed out on HFNC and oxygen saturation 88-89%.  ABG pending, duo-nebs ordered and steroids ordered.  Attempted to notify Daughter but no answer.  Will trial bipap if patient can tolerate. Will obtain blood cultures and start antibiotic therapy. Discussed case with Dr. Abarca whom agrees with plan.

## 2025-02-14 ENCOUNTER — APPOINTMENT (OUTPATIENT)
Dept: GENERAL RADIOLOGY | Facility: HOSPITAL | Age: 85
DRG: 193 | End: 2025-02-14
Payer: MEDICARE

## 2025-02-14 LAB — BACTERIA SPEC AEROBE CULT: NORMAL

## 2025-02-14 PROCEDURE — 92526 ORAL FUNCTION THERAPY: CPT

## 2025-02-14 PROCEDURE — 25010000002 HEPARIN (PORCINE) PER 1000 UNITS

## 2025-02-14 PROCEDURE — 25810000003 SODIUM CHLORIDE 0.9 % SOLUTION 250 ML FLEX CONT: Performed by: NURSE PRACTITIONER

## 2025-02-14 PROCEDURE — 94799 UNLISTED PULMONARY SVC/PX: CPT

## 2025-02-14 PROCEDURE — 25010000002 CEFTRIAXONE PER 250 MG: Performed by: NURSE PRACTITIONER

## 2025-02-14 PROCEDURE — 99232 SBSQ HOSP IP/OBS MODERATE 35: CPT | Performed by: INTERNAL MEDICINE

## 2025-02-14 PROCEDURE — 25010000002 METHYLPREDNISOLONE PER 125 MG: Performed by: NURSE PRACTITIONER

## 2025-02-14 PROCEDURE — 25010000002 AZITHROMYCIN PER 500 MG: Performed by: NURSE PRACTITIONER

## 2025-02-14 PROCEDURE — 94664 DEMO&/EVAL PT USE INHALER: CPT

## 2025-02-14 PROCEDURE — 71045 X-RAY EXAM CHEST 1 VIEW: CPT

## 2025-02-14 RX ADMIN — IPRATROPIUM BROMIDE AND ALBUTEROL SULFATE 3 ML: 2.5; .5 SOLUTION RESPIRATORY (INHALATION) at 12:30

## 2025-02-14 RX ADMIN — IPRATROPIUM BROMIDE AND ALBUTEROL SULFATE 3 ML: 2.5; .5 SOLUTION RESPIRATORY (INHALATION) at 08:06

## 2025-02-14 RX ADMIN — AMLODIPINE BESYLATE 2.5 MG: 2.5 TABLET ORAL at 20:26

## 2025-02-14 RX ADMIN — Medication 10 ML: at 20:28

## 2025-02-14 RX ADMIN — GUAIFENESIN 600 MG: 600 TABLET ORAL at 08:30

## 2025-02-14 RX ADMIN — OSELTAMIVIR PHOSPHATE 30 MG: 30 CAPSULE ORAL at 20:25

## 2025-02-14 RX ADMIN — OSELTAMIVIR PHOSPHATE 30 MG: 30 CAPSULE ORAL at 08:30

## 2025-02-14 RX ADMIN — METHYLPREDNISOLONE SODIUM SUCCINATE 60 MG: 125 INJECTION INTRAMUSCULAR; INTRAVENOUS at 00:18

## 2025-02-14 RX ADMIN — SODIUM CHLORIDE 1000 MG: 900 INJECTION INTRAVENOUS at 05:06

## 2025-02-14 RX ADMIN — METHYLPREDNISOLONE SODIUM SUCCINATE 60 MG: 125 INJECTION INTRAMUSCULAR; INTRAVENOUS at 08:29

## 2025-02-14 RX ADMIN — PHENOL 1 SPRAY: 1.5 LIQUID ORAL at 20:29

## 2025-02-14 RX ADMIN — Medication 10 ML: at 08:30

## 2025-02-14 RX ADMIN — Medication 10 ML: at 16:31

## 2025-02-14 RX ADMIN — Medication 5 MG: at 20:25

## 2025-02-14 RX ADMIN — METHYLPREDNISOLONE SODIUM SUCCINATE 60 MG: 125 INJECTION INTRAMUSCULAR; INTRAVENOUS at 16:31

## 2025-02-14 RX ADMIN — HEPARIN SODIUM 5000 UNITS: 5000 INJECTION INTRAVENOUS; SUBCUTANEOUS at 08:29

## 2025-02-14 RX ADMIN — PANTOPRAZOLE SODIUM 40 MG: 40 TABLET, DELAYED RELEASE ORAL at 08:30

## 2025-02-14 RX ADMIN — GUAIFENESIN 600 MG: 600 TABLET ORAL at 20:26

## 2025-02-14 RX ADMIN — DONEPEZIL HYDROCHLORIDE 5 MG: 5 TABLET ORAL at 20:26

## 2025-02-14 RX ADMIN — AMLODIPINE BESYLATE 2.5 MG: 2.5 TABLET ORAL at 08:30

## 2025-02-14 RX ADMIN — FUROSEMIDE 20 MG: 20 TABLET ORAL at 08:30

## 2025-02-14 RX ADMIN — HEPARIN SODIUM 5000 UNITS: 5000 INJECTION INTRAVENOUS; SUBCUTANEOUS at 20:29

## 2025-02-14 RX ADMIN — ATORVASTATIN CALCIUM 20 MG: 20 TABLET, FILM COATED ORAL at 20:26

## 2025-02-14 RX ADMIN — IPRATROPIUM BROMIDE AND ALBUTEROL SULFATE 3 ML: 2.5; .5 SOLUTION RESPIRATORY (INHALATION) at 20:04

## 2025-02-14 RX ADMIN — AZITHROMYCIN DIHYDRATE 500 MG: 500 INJECTION, POWDER, LYOPHILIZED, FOR SOLUTION INTRAVENOUS at 05:28

## 2025-02-14 RX ADMIN — IPRATROPIUM BROMIDE AND ALBUTEROL SULFATE 3 ML: 2.5; .5 SOLUTION RESPIRATORY (INHALATION) at 16:42

## 2025-02-14 NOTE — PROGRESS NOTES
"          Clinical Nutrition Assessment     Patient Name: Ashley Younger  YOB: 1940  MRN: [id number]  Date of Encounter: 02/14/25 16:33 EST  Admission date: 2/11/2025  Reason for Visit: Follow-up protocol, Malnutrition Severity Assessment    Assessment   Nutrition Assessment   Admission Diagnosis:  Influenza [J11.1]    Problem List:    Influenza    Urinary tract infection, site not specified    Hypoxia      PMH:   She  has a past medical history of BENJIE (acute kidney injury), Arthritis, Cholelithiasis, Chronic kidney disease, stage 3 unspecified, DJD (degenerative joint disease), Encounter for removal of sutures, GERD (gastroesophageal reflux disease), Headache, History of  brain aneurysm, Hyperlipidemia, Hypertension, Orthostatic hypotension, Osteoarthritis, Presence of neurostimulator, Recurrent falls, Renal insufficiency, Scalp laceration, and Urinary tract infection.    PSH:  She  has a past surgical history that includes Cholecystectomy; ORIF wrist fracture (Left); Cranial Neurostimulator Insertion/Replacement; and ERCP.    Applicable Nutrition History:   2/12 FEES SLP rec Mechanical soft food No Mixed Consistencies, Honey thick liquid.  2/13 SLP rec NPO vs [facility] Soft comfort diet.    Anthropometrics     Height: Height: 167.6 cm (66\")  Last Filed Weight: Weight: 54.4 kg (120 lb) (02/11/25 1103)  Method:  no method  BMI: BMI (Calculated): 19.4    UBW:  Per EMR w of 122 lbs on 7/3/24 and 125 lbs on 9/4/24  Weight change: inadequate data for evaluation at this time.    Nutrition Focused Physical Exam    Date: 2/14    Patient meets criteria for malnutrition diagnosis, see MSA note.     Subjective   Reported/Observed/Food/Nutrition Related History:     2/14  Pt able to participate in exam but unable to give much information about hx. Per RN pt takes some food items and just rejects others. RN not aware of Magic Cup on trays.    2/13  Pt slumbering at time of visit. Per RN pt not eating much. Might try " "to feed Magic Cup to her.    Needs Assessment   Date:2/13    Height used:Height: 167.6 cm (66\")  Weights used: 54.4 Kg    Estimated Calorie needs: ~ 1400 Kcal/day  Method:  [id number] x 1.3 = 1334  Method:  Kcals/KGx 25 = 1362    Estimated Protein needs: ~65 g PRO/day  Method: g/Kg 1.2    Estimated Fluid needs: ~ ml/day   Per clinical status    Current Nutrition Prescription   PO: Diet: Regular/House; No Mixed Consistencies, No Straw, Feeding Assistance - Nursing; Texture: Mechanical Ground (NDD 2); Fluid Consistency: Honey Thick  Oral Nutrition Supplement: Magic Cup 2x/da  Intake: 2 Days:13% x  meals recorded    Assessment & Plan   Nutrition Diagnosis   Date:  2/13            Updated:  2/14  Problem Inadequate oral intake    Etiology AMS Dysphagia w modified diet   Signs/Symptoms 13% x 4 meals recorded   Status: Active    Date:  2/14 Updated:  Problem Malnutrition  moderate chronic   Etiology Likely inconsistent intake   Signs/Symptoms Wasting   Status: New      Goal / Objectives:   Nutrition to support treatment and Increase intake as feasible/appropriate      Nutrition Intervention      Follow treatment progress, Care plan reviewed, Advise alternate selection to RN    Menu in room for RN use if cont on comfort diet  Continue Magic Cup 2x/da     IF determine EN appropriate consider:  Fibersource HN begin 25 ml/hr advance w tolerance by 15 ml/hr ev 12 hr to goal 55 ml/hr Water at 20 ml/hr. Over 22 hr to supply 1210 ml for 1452 Kcal 104% est need, 65 g % est need, 18.4 g Fiber 980 ml Water in EN 1420 total ml Free Water.     Monitoring/Evaluation:   Per protocol, I&O, PO intake, Supplement intake, Pertinent labs, Weight, Symptoms, POC/GOC, Swallow function    Belen Ferrell RD  Time Spent: 25 min  "

## 2025-02-14 NOTE — PLAN OF CARE
Problem: Adult Inpatient Plan of Care  Goal: Plan of Care Review  Outcome: Progressing  Flowsheets (Taken 2/14/2025 4155)  Plan of Care Reviewed With: patient   Goal Outcome Evaluation:  Plan of Care Reviewed With: patient         SLP treatment completed. Will continue to address dysphagia in tx. Please see note for further details and recommendations.                       Treatment Assessment (SLP): suspected, continued, pharyngeal dysphagia (02/14/25 1530)  Treatment Assessment Comments (SLP): Pt continues on HFNC. No s/sx aspiration with current diet of mechanical soft solids and honey-thick liquids. Attempted to teach compensations and dysphagia exercises, but pt not comprehending most of it. (02/14/25 1530)  Plan for Continued Treatment (SLP): continue treatment per plan of care (02/14/25 1530)

## 2025-02-14 NOTE — THERAPY TREATMENT NOTE
Acute Care - Speech Language Pathology   Swallow Treatment Note [facility]     Patient Name: Ashley Younger  : 1940  MRN: [id number]  Today's Date: 2025               Admit Date: 2025    Visit Dx:     ICD-10-CM ICD-9-CM   1. Hypoxia  R09.02 799.02   2. Influenza A  J10.1 487.1   3. Acute UTI (urinary tract infection)  N39.0 599.0   4. Oropharyngeal dysphagia  R13.12 787.22     Patient Active Problem List   Diagnosis    Common bile duct (CBD) obstruction    Hypertension    Sepsis    Fracture of left shoulder girdle, part unspecified, subsequent encounter for fracture with routine healing    Muscle weakness (generalized)    Urinary tract infection, site not specified    Wedge compression fracture of t5-T6 vertebra, subsequent encounter for fracture with routine healing    Encephalopathy, unspecified    Fall at home    Dehydration    Cholangitis    Influenza    Hypoxia     Past Medical History:   Diagnosis Date    BENJIE (acute kidney injury)     Arthritis     Cholelithiasis     Chronic kidney disease, stage 3 unspecified     DJD (degenerative joint disease)     Encounter for removal of sutures     GERD (gastroesophageal reflux disease)     Headache     History of  brain aneurysm     Hyperlipidemia     Hypertension     Orthostatic hypotension     Osteoarthritis     Presence of neurostimulator     Recurrent falls     Renal insufficiency     Scalp laceration     Urinary tract infection      Past Surgical History:   Procedure Laterality Date    CHOLECYSTECTOMY      CRANIAL NEUROSTIMULATOR INSERTION/REPLACEMENT      TUMOR    ERCP      WITH STONE REMOVED    ORIF WRIST FRACTURE Left        SLP Recommendation and Plan                                               Daily Summary of Progress (SLP): progress toward functional goals as expected (25)               Treatment Assessment (SLP): suspected, continued, pharyngeal dysphagia (25 153)  Treatment Assessment Comments (SLP): Pt continues on  HFNC. No s/sx aspiration with current diet of mechanical soft solids and honey-thick liquids. Attempted to teach compensations and dysphagia exercises, but pt not comprehending most of it. (02/14/25 1530)  Plan for Continued Treatment (SLP): continue treatment per plan of care (02/14/25 1530)                SWALLOW EVALUATION (Last 72 Hours)       SLP Adult Swallow Evaluation       Row Name 02/14/25 1530 02/13/25 1335 02/12/25 1405 02/12/25 1010          Rehab Evaluation    Document Type therapy note (daily note)  - therapy note (daily note)  - evaluation  - evaluation  -     Subjective Information no complaints  - no complaints  - no complaints  - no complaints  -     Patient Observations alert;cooperative  - alert;cooperative  - alert;cooperative  - alert;cooperative  -     Patient/Family/Caregiver Comments/Observations no family present  - No family present  - no family present  - family present  -     Patient Effort good  - good  - good  - good  -     Symptoms Noted During/After Treatment none  - none  - none  - none  -        General Information    Patient Profile Reviewed -- -- yes  - yes  -     Pertinent History Of Current Problem -- -- See initial eval, pt referred for [facility] [place]  - Adm w/ hypoxia, flu A + on arrival. Hx: dementia, GERD, HTN, HLD. CXR w/o evidence of acute cardiopulmonary findings  -     Current Method of Nutrition -- -- mechanical ground textures;thin liquids  - NPO  -     Precautions/Limitations, Vision -- -- WFL;for purposes of eval  - WFL;for purposes of eval  -     Precautions/Limitations, Hearing -- -- WFL;for purposes of eval  - WFL;for purposes of eval  -     Prior Level of Function-Communication -- -- other (see comments)  dementia per chart  - other (see comments)  dementia per chart  -     Prior Level of Function-Swallowing -- -- other (see comments)  MBS completed @ [facility] (October '23) w/ recs for regular solid diet  "w/ HTL. Repeat MBS (October '23) w/ recs for soft solid diet w/ NTL. Family reports pt has since been on \"soft\" solid diet w/ thin liquids.  - other (see comments)  MBS completed @ [facility] (October '23) w/ recs for regular solid diet w/ HTL. Repeat MBS (October '23) w/ recs for soft solid diet w/ NTL. Family reports pt has since been on \"soft\" solid diet w/ thin liquids.  -     Plans/Goals Discussed with -- -- patient;agreed upon  - patient;family;agreed upon  -     Barriers to Rehab -- -- none identified  - none identified  -     Patient's Goals for Discharge -- -- patient did not state  - patient did not state  -     Family Goals for Discharge -- -- -- family did not state  -        Pain    Pretreatment Pain Rating 0/10 - no pain  -DV -- -- --     Posttreatment Pain Rating 0/10 - no pain  -DV -- -- --     Additional Documentation -- Pain Scale: FACES Pre/Post-Treatment (Group)  - Pain Scale: FACES Pre/Post-Treatment (Group)  - Pain Scale: FACES Pre/Post-Treatment (Group)  -        Pain Scale: FACES Pre/Post-Treatment    Pain: FACES Scale, Pretreatment -- 0-->no hurt  - 0-->no hurt  - 0-->no hurt  -     Posttreatment Pain Rating -- 0-->no hurt  - 0-->no hurt  - 0-->no hurt  -        Oral Motor Structure and Function    Secretion Management -- -- -- WNL/WFL  -     Mucosal Quality -- -- -- moist, healthy  -        Oral Musculature and Cranial Nerve Assessment    Oral Motor General Assessment -- -- -- generalized oral motor weakness  -        General Eating/Swallowing Observations    Respiratory Support Currently in Use -- -- -- nasal cannula  -     O2 Liters -- -- -- 6L  -     Eating/Swallowing Skills -- -- -- self-fed;fed by SLP  -     Positioning During Eating -- -- -- upright 90 degree;upright in chair  -     Utensils Used -- -- -- spoon;cup;straw  -     Consistencies Trialed -- -- -- ice chips;thin liquids;pureed;regular textures  -        Clinical Swallow Eval    " "Oral Prep Phase -- -- -- impaired  -     Oral Residue -- -- -- impaired  -     Clinical Swallow Evaluation Summary -- -- -- Pt w/ intermittent cough t/o eval & prior to PO trials. Immediate cough w/ thins via straw only. No additional s/s of aspiration accross trials of thin via cup, pureed, or w/ regular solid consistenices. Oral phase generally weak appearing. Pt w/ prolonged mastication & residue w/ regular solid trial. Family reports pt is \"soft\" solid diet w/ thin liquids @ baseline. Recommend mechanical ground solid diet w/ thin liquids, no straw.  Plan for [facility] [place] this PM  -        Oral Prep Concerns    Oral Prep Concerns -- -- -- prolonged mastication  -     Prolonged Mastication -- -- -- regular consistencies  -        Oral Residue Concerns    Oral Residue Concerns -- -- -- diffuse residue throughout oral cavity  -     Diffuse Residue Throughout Oral Cavity -- -- -- regular consistencies  -        MBS/VFSS    Utensils Used -- -- cup;spoon;straw  - --     Consistencies Trialed -- -- thin liquids;nectar/syrup-thick liquids;honey-thick liquids;pudding thick;regular textures  - --        MBS/VFSS Interpretation    Oral Prep Phase -- -- impaired oral phase of swallowing  - --     Oral Transit Phase -- -- impaired  - --     Oral Residue -- -- impaired  - --     VFSS Summary -- -- Moderate oral and moderate to severe pharyngeal dysphagia. Deep penetration w/ thins before, during & after the swallow. Penetration w/ NTL during the swallow w/ continued penetration after the swallow. Diffuse residue w/ all consistenices assessed, residue worse w/ pudding & regular solid consistenices. Penetration w/ pudding when mixed w/ residue from previous trial that pt was u/a to fully clear. Penetration w/ HTL/residue. No aspiration directly observed, however pt felt to be @ general risk of aspiration w/ all PO intake @ this time. Discussed results & recommendations w/ MD, ok'd \"safest\" PO diet option. If cont " PO diet, recommend mechanical ground solid diet w/ HTL, no straw, assist w/ feeding, cue pt for throat clear + extra intermittently, alternate bites/sips. SLP will cont to f/u for tx  - --        Oral Preparatory Phase    Oral Preparatory Phase -- -- reduced lip closure around utensil;anterior loss;prolonged manipulation  - --     Reduced Lip Closure Around Utensil -- -- thin liquids;nectar-thick liquids;secondary to impaired cognitive status;secondary to reduced lingual range of motion;secondary to reduced lingual strength  - --     Anterior Loss -- -- thin liquids;secondary to reduced lingual range of motion;secondary to reduced lingual strength;secondary to reduced labial seal;secondary to impaired cognitive status  - --     Prolonged Manipulation -- -- pudding/puree;regular textures;secondary to reduced lingual range of motion;secondary to impaired cognitive status;secondary to reduced lingual strength;secondary to reduced labial seal  - --        Oral Transit Phase    Impaired Oral Transit Phase -- -- increased A-P transit time;premature spillage of liquids into pharynx  - --     Increased A-P Transit Time -- -- all consistencies tested;secondary to impaired cognitive status;discoordination of lingual movement;secondary to reduced lingual control  - --     Premature Spillage of Liquids into Pharynx -- -- thin liquids;nectar-thick liquids;discoordination of lingual movement;secondary to impaired cognitive status;secondary to reduced lingual control  - --        Oral Residue    Impaired Oral Residue -- -- diffuse residue throughout oral cavity  - --     Diffuse Residue throughout Oral Cavity -- -- all consistencies tested;secondary to reduced lingual range of motion;secondary to reduced lingual strength  - --     Response to Oral Residue -- -- partial residue clearance;with spontaneous subsequent swallow  - --        Initiation of Pharyngeal Swallow    Initiation of Pharyngeal Swallow -- --  bolus in pyriform sinuses  - --     Pharyngeal Phase -- -- impaired pharyngeal phase of swallowing  - --     Anatomical abnormalities noted -- -- osteophyte/bone spur per radiology report;reverse lordotic c-spine curvature;hyperlordotic c-spine curvature  - --     Anatomical abnormalities functional impact -- -- functional impact on swallowing;other (see comments)  contributes to residue  - --     Penetration Before the Swallow -- -- thin liquids;secondary to delayed swallow initiation or mistiming;secondary to reduced back of tongue control  - --     Penetration During the Swallow -- -- thin liquids;nectar-thick liquids;honey-thick liquids;pudding/puree;secondary to delayed swallow initiation or mistiming;secondary to reduced laryngeal elevation;secondary to reduced vestibular closure  - --     Penetration After the Swallow -- -- thin liquids;nectar-thick liquids;secondary to residue;in valleculae;in pyriform sinuses  - --     Depth of Penetration -- -- deep  - --     Response to Penetration -- -- No  -MH --     No spontaneous response to penetration and -- -- non-effective laryngeal clearance with cue (see comments);other (see comments)  cued cough  - --     Rosenbek's Scale -- -- thin:;nectar:;3--->level 3;honey:;pudding/puree:;2--->level 2  - --     Pharyngeal Residue -- -- all consistencies tested;diffuse within pharynx;secondary to reduced hyolaryngeal excursion;secondary to reduced laryngeal elevation;secondary to reduced posterior pharyngeal wall stripping;secondary to reduced base of tongue retraction  - --     Response to Residue -- -- unable to clear residue;cleared residue with spontaneous subsequent swallow;cleared residue with cued swallow  - --     Attempted Compensatory Maneuvers -- -- bolus size;bolus presentation style;additional subsequent swallow;multiple swallows  - --     Response to Attempted Compensatory Maneuvers -- -- did not prevent penetration  - --      Successful Compensatory Maneuver Competency -- -- patient able to;demonstrate compensations;with cues  - --        SLP Evaluation Clinical Impression    SLP Swallowing Diagnosis -- -- moderate;oral dysphagia;mod-severe;pharyngeal dysphagia  - oral dysphagia;R/O pharyngeal dysphagia  -     Functional Impact -- -- risk of aspiration/pneumonia  - risk of aspiration/pneumonia  -     Rehab Potential/Prognosis, Swallowing -- -- good, to achieve stated therapy goals  - good, to achieve stated therapy goals  -     Swallow Criteria for Skilled Therapeutic Interventions Met -- -- demonstrates skilled criteria  - demonstrates skilled criteria  -        SLP Treatment Clinical Impressions    Treatment Assessment (SLP) suspected;continued;pharyngeal dysphagia  - suspected;continued;pharyngeal dysphagia  - -- --     Treatment Assessment Comments (SLP) Pt continues on HFNC. No s/sx aspiration with current diet of mechanical soft solids and honey-thick liquids. Attempted to teach compensations and dysphagia exercises, but pt not comprehending most of it.  -DV Pt w/ increased O2 requirement, currently on HFNC. Repeat CXR w/o significant changes from prior. Pt w/ cough t/o session. Cough did not appear directly r/t PO, however difficult to determine. Reviewed & completed all exercises, pt w/ difficulty completing exercises requiring multiple steps. Per chart, aspiration of  pudding/HTL was not observed during MBS 2/12; however, pt felt to be @ risk over course of a meal. Given respiratory status + increased O2 requirement, pt felt to be @ risk of asopiration w/ all PO intake. MD made aware, GOC/POC ongoing  - -- --     Daily Summary of Progress (SLP) progress toward functional goals as expected  - progress toward functional goals as expected  - -- --     Plan for Continued Treatment (SLP) continue treatment per plan of care  - continue treatment per plan of care  - -- --     Care Plan Review  evaluation/treatment results reviewed;care plan/treatment goals reviewed;risks/benefits reviewed;current/potential barriers reviewed;patient/other agree to care plan  -DV care plan/treatment goals reviewed  - -- --        Recommendations    Therapy Frequency (Swallow) -- 5 days per week  - 5 days per week  - --     Predicted Duration Therapy Intervention (Days) -- 1 week  - 1 week  - 1 week  -     SLP Diet Recommendation -- --  Safest rec NPO vs PO diet per MD discretion. If cont PO diet, rec mechanical ground solid diet w/ HTL  - mechanical ground textures;honey thick liquids;no mixed consistencies  - mechanical ground textures;thin liquids  -     Recommended Diagnostics -- -- -- reassess via [facility] ([facility] [place]  -     Recommended Precautions and Strategies -- general aspiration precautions;assist with feeding;no straw;alternate between small bites of food and sips of liquid;other (see comments)  cue pt for throat clear + extra intermittently  - general aspiration precautions;assist with feeding;no straw;alternate between small bites of food and sips of liquid  cue pt for throat clear + extra intermittently  - general aspiration precautions;no straw  -     Oral Care Recommendations -- Oral Care BID/PRN;Suction toothbrush  - Oral Care BID/PRN;Suction toothbrush  - Oral Care BID/PRN;Toothbrush  -     SLP Rec. for Method of Medication Administration -- meds whole;meds crushed;with puree;as tolerated  - meds whole;meds crushed;with puree;as tolerated  - meds whole;meds crushed;with puree;as tolerated  -     Monitor for Signs of Aspiration -- yes;notify SLP if any concerns  - yes;notify SLP if any concerns  - yes;notify SLP if any concerns  -     Anticipated Discharge Disposition (SLP) -- skilled nursing facility  - skilled nursing facility  - skilled nursing facility  -               User Key  (r) = Recorded By, (t) = Taken By, (c) = Cosigned By      Initials Name Effective  Dates    DV Brooke Santos, MS CCC-SLP 06/16/21 -      Mikaela Guzman, MS CCC-SLP 05/12/23 -                     EDUCATION  The patient has been educated in the following areas:   Dysphagia (Swallowing Impairment) Modified Diet Instruction.        SLP GOALS       Row Name 02/14/25 1530 02/13/25 1335 02/12/25 1405       (LTG) Patient will demonstrate functional swallow for    Diet Texture (Demonstrate functional swallow) regular textures  - regular textures  - regular textures  -    Liquid viscosity (Demonstrate functional swallow) thin liquids  -DV thin liquids  - thin liquids  -    Stella (Demonstrate functional swallow) with minimal cues (75-90% accuracy)  -DV with minimal cues (75-90% accuracy)  - with minimal cues (75-90% accuracy)  -    Time Frame (Demonstrate functional swallow) 1 week  -DV 1 week  - 1 week  -    Progress/Outcomes (Demonstrate functional swallow) continuing progress toward goal  -DV continuing progress toward goal  - new goal  -       (STG) Patient will tolerate trials of    Consistencies Trialed (Tolerate trials) mechanical ground textures;honey/ moderately thick liquids  - mechanical ground textures;honey/ moderately thick liquids  - mechanical ground textures;honey/ moderately thick liquids  -    Desired Outcome (Tolerate trials) without signs/symptoms of aspiration;with adequate oral prep/transit/clearance;with use of compensatory strategies (see comments)  -DV without signs/symptoms of aspiration;with adequate oral prep/transit/clearance;with use of compensatory strategies (see comments)  no straw, assist w/ feeding, cue pt for throat clear + extra intermittently, alternate bites/sips  - without signs/symptoms of aspiration;with adequate oral prep/transit/clearance;with use of compensatory strategies (see comments)  no straw, assist w/ feeding, cue pt for throat clear + extra intermittently, alternate bites/sips  -    Stella (Tolerate trials)  with 1:1 assist/ supervision  - with 1:1 assist/ supervision  - with 1:1 assist/ supervision  -    Time Frame (Tolerate trials) 1 week  -DV 1 week  - 1 week  -    Progress/Outcomes (Tolerate trials) continuing progress toward goal  -DV continuing progress toward goal  - new goal  -    Comment (Tolerate trials) no s/sx any trials  -DV Pt w/ intermittent cough t/o session, cough did not appear to worsen or become more frequent w/ PO  - --       (STG) Lingual Strengthening Goal 1 (SLP)    Activity (Lingual Strengthening Goal 1, SLP) increase lingual tone/sensation/control/coordination/movement;increase tongue back strength  - increase lingual tone/sensation/control/coordination/movement;increase tongue back strength  - increase lingual tone/sensation/control/coordination/movement;increase tongue back strength  -    Increase Lingual Tone/Sensation/Control/Coordination/Movement lingual resistance exercises;swallow trials  - lingual resistance exercises;swallow trials  - lingual resistance exercises;swallow trials  -    Increase Tongue Back Strength lingual resistance exercises  - lingual resistance exercises  - lingual resistance exercises  -    Glenn/Accuracy (Lingual Strengthening Goal 1, SLP) with moderate cues (50-74% accuracy)  - with moderate cues (50-74% accuracy)  - with moderate cues (50-74% accuracy)  -    Time Frame (Lingual Strengthening Goal 1, SLP) 1 week  -DV 1 week  - 1 week  -    Progress/Outcomes (Lingual Strengthening Goal 1, SLP) continuing progress toward goal  -DV continuing progress toward goal  - new goal  -    Comment (Lingual Strengthening Goal 1, SLP) completed  -DV -- --       (STG) Pharyngeal Strengthening Exercise Goal 1 (SLP)    Activity (Pharyngeal Strengthening Goal 1, SLP) increase timing;increase superior movement of the hyolaryngeal complex;increase anterior movement of the hyolaryngeal complex;increase closure at entrance to  airway/closure of airway at glottis;increase squeeze/positive pressure generation  -DV increase timing;increase superior movement of the hyolaryngeal complex;increase anterior movement of the hyolaryngeal complex;increase closure at entrance to airway/closure of airway at glottis;increase squeeze/positive pressure generation  - increase timing;increase superior movement of the hyolaryngeal complex;increase anterior movement of the hyolaryngeal complex;increase closure at entrance to airway/closure of airway at glottis;increase squeeze/positive pressure generation  -    Increase Timing prepping - 3 second prep or suck swallow or 3-step swallow  -DV prepping - 3 second prep or suck swallow or 3-step swallow  - prepping - 3 second prep or suck swallow or 3-step swallow  -    Increase Superior Movement of the Hyolaryngeal Complex effortful pitch glide (falsetto + pharyngeal squeeze)  -DV effortful pitch glide (falsetto + pharyngeal squeeze)  - effortful pitch glide (falsetto + pharyngeal squeeze)  -    Increase Anterior Movement of the Hyolaryngeal Complex chin tuck against resistance (CTAR)  -DV chin tuck against resistance (CTAR)  - chin tuck against resistance (CTAR)  -    Increase Closure at Entrance to Airway/Closure of Airway at Glottis supraglottic swallow  -DV supraglottic swallow  - supraglottic swallow  -    Increase Squeeze/Positive Pressure Generation hard effortful swallow  -DV hard effortful swallow  - hard effortful swallow  -    Warden/Accuracy (Pharyngeal Strengthening Goal 1, SLP) with moderate cues (50-74% accuracy)  -DV with moderate cues (50-74% accuracy)  - with moderate cues (50-74% accuracy)  -    Time Frame (Pharyngeal Strengthening Goal 1, SLP) 1 week  -DV 1 week  - 1 week  -    Progress/Outcomes (Pharyngeal Strengthening Goal 1, SLP) continuing progress toward goal  -DV continuing progress toward goal  - new goal  -    Comment (Pharyngeal Strengthening  Goal 1, SLP) completed falsetto, attempted others with little success  -DV Focsued on CTAR & hard effortful swallow, pt w/ difficulty completing exercises requiring multiple steps  - --              User Key  (r) = Recorded By, (t) = Taken By, (c) = Cosigned By      Initials Name Provider Type    Brooke Corcoran MS CCC-SLP Speech and Language Pathologist    Mikaela Rueda MS CCC-SLP Speech and Language Pathologist                         Time Calculation:    Time Calculation- SLP       Row Name 02/14/25 1650             Time Calculation- SLP    SLP Start Time 1530  -DV      SLP Received On 02/14/25  -DV         Untimed Charges    SLP Treatment ST Treatment Swallow Minutes  - 43984  -DV      [id number] Treatment Swallow Minutes 37  -DV         Total Minutes    Untimed Charges Total Minutes 37  -DV       Total Minutes 37  -DV                User Key  (r) = Recorded By, (t) = Taken By, (c) = Cosigned By      Initials Name Provider Type    DV Brooke Santos MS CCC-SLP Speech and Language Pathologist                    Therapy Charges for Today       Code Description Service Date Service Provider Modifiers Qty    [id number]  ST TREATMENT SWALLOW 2 2/14/2025 Brooke Santos MS CCC-SLP GN 1                 Brooke Santos MS CCC-HANDY  2/14/2025

## 2025-02-14 NOTE — PLAN OF CARE
Problem: Adult Inpatient Plan of Care  Goal: Absence of Hospital-Acquired Illness or Injury  Intervention: Identify and Manage Fall Risk  Recent Flowsheet Documentation  Taken 2/14/2025 0202 by Zainab Ch RN  Safety Promotion/Fall Prevention:   activity supervised   assistive device/personal items within reach   clutter free environment maintained   fall prevention program maintained   room organization consistent   safety round/check completed  Taken 2/14/2025 0015 by Zainab Ch RN  Safety Promotion/Fall Prevention:   activity supervised   assistive device/personal items within reach   clutter free environment maintained   fall prevention program maintained   lighting adjusted   room organization consistent   safety round/check completed  Taken 2/13/2025 2210 by Zainab Ch RN  Safety Promotion/Fall Prevention:   activity supervised   assistive device/personal items within reach   clutter free environment maintained   fall prevention program maintained   room organization consistent   safety round/check completed  Taken 2/13/2025 2045 by Zainab Ch RN  Safety Promotion/Fall Prevention:   activity supervised   assistive device/personal items within reach   clutter free environment maintained   fall prevention program maintained   room organization consistent   safety round/check completed   Goal Outcome Evaluation:

## 2025-02-14 NOTE — PROGRESS NOTES
[facility] Medicine Services  PROGRESS NOTE    Patient Name: Ashley Younger  : 1940  MRN: [id number]    Date of Admission: 2025  Primary Care Physician: John Chen MD    Subjective   Subjective     CC:  Influenza/hypoxia    HPI:  Doing about the same. Remains on HFNC. Reports she feels well today. Asking for regular water- d/w her that she needs thickening agents due to aspiration risk    Ros   As above      Objective   Objective     Vital Signs:   Temp:  [96.1 °F (35.6 °C)-99.3 °F (37.4 °C)] 96.1 °F (35.6 °C)  Heart Rate:  [58-75] 70  Resp:  [16-18] 16  BP: (107-126)/(58-75) 119/75  Flow (L/min) (Oxygen Therapy):  [50-55] 55     Physical Exam:  GEN: NAD, resting in bed, awake  HEENT: on HFNC, resting in bed   NECK: supple, no masses  RESP: on HFNC, coarse bs  CV: on tele, sinus rhythm  PSYCH: normal affect, appropriate  NEURO: awake, alert, no focal deficits noted  MSK: no edema noted  SKIN: no rashes noted       Results Reviewed:  LAB RESULTS:      Lab 25  0834 25  1148   WBC 9.44 6.35 4.84   HEMOGLOBIN 14.2 13.5 13.2   HEMATOCRIT 45.4 43.4 42.2   PLATELETS 160 165 164   NEUTROS ABS 6.66 3.79 2.63   IMMATURE GRANS (ABS) 0.02 0.02 0.01   LYMPHS ABS 1.83 1.50 1.27   MONOS ABS 0.90 0.99* 0.89   EOS ABS 0.01 0.03 0.02   MCV 99.6* 99.5* 99.1*   PROCALCITONIN 0.07  --   --    LACTATE 1.5  --  0.7         Lab 25  0834 25  1148   SODIUM 139 142 141   POTASSIUM 3.9 4.0 3.9   CHLORIDE 99 100 103   CO2 26.0 31.0* 30.0*   ANION GAP 14.0 11.0 8.0   BUN 23 21 18   CREATININE 0.86 0.94 0.75   EGFR 67.5 60.7 79.6   GLUCOSE 109* 89 104*   CALCIUM 8.3* 8.2* 8.3*   MAGNESIUM  --  1.9 1.9   PHOSPHORUS  --  3.8  --          Lab 25  0127 25  0834 25  1148   TOTAL PROTEIN 6.7 6.5 6.7   ALBUMIN 3.6 3.8 3.9   GLOBULIN 3.1 2.7 2.8   ALT (SGPT) 9 12 9   AST (SGOT) 25 30 31   BILIRUBIN 0.5 0.5 0.4   ALK PHOS 73 72 76                      Lab 02/13/25  0128   PH, ARTERIAL 7.456*   PCO2, ARTERIAL 46.7*   PO2 ART 75.2*   FIO2 90   HCO3 ART 32.9*   BASE EXCESS ART 7.8*   CARBOXYHEMOGLOBIN 0.8     Brief Urine Lab Results  (Last result in the past 365 days)        Color   Clarity   Blood   Leuk Est   Nitrite   Protein   CREAT   Urine HCG        02/12/25 1558 Dark Yellow   Turbid   Large (3+)   Moderate (2+)   Positive   Trace                   Microbiology Results Abnormal       Procedure Component Value - Date/Time    Urine Culture - Urine, Urine, Clean Catch [[id number]]  (Abnormal) Collected: 02/12/25 1558    Lab Status: Preliminary result Specimen: Urine, Clean Catch Updated: 02/13/25 1211     Urine Culture >100,000 CFU/mL Gram Negative Bacilli    Narrative:      Colonization of the urinary tract without infection is common. Treatment is discouraged unless the patient is symptomatic, pregnant, or undergoing an invasive urologic procedure.    COVID PRE-OP / PRE-PROCEDURE SCREENING ORDER (NO ISOLATION) - Swab, Nasopharynx [[id number]]  (Abnormal) Collected: 02/11/25 1128    Lab Status: Final result Specimen: Swab from Nasopharynx Updated: 02/11/25 1346    Narrative:      The following orders were created for panel order COVID PRE-OP / PRE-PROCEDURE SCREENING ORDER (NO ISOLATION) - Swab, Nasopharynx.  Procedure                               Abnormality         Status                     ---------                               -----------         ------                     COVID-19, FLU A/B, RSV P...[[id number]]  Abnormal            Final result                 Please view results for these tests on the individual orders.    COVID-19, FLU A/B, RSV PCR 1 HR TAT - Swab, Nasopharynx [[id number]]  (Abnormal) Collected: 02/11/25 1128    Lab Status: Final result Specimen: Swab from Nasopharynx Updated: 02/11/25 1346     COVID19 Not Detected     Influenza A PCR Detected     Influenza B PCR Not Detected     RSV, PCR Not Detected    Narrative:       Fact sheet for providers: https://www.fda.gov/media/162582/download    Fact sheet for patients: https://www.fda.gov/media/478935/download    Test performed by PCR.            XR Chest 1 View    Result Date: 2/14/2025  XR CHEST 1 VW Date of Exam: 2/14/2025 2:12 AM EST Indication: hypoxia/ flu Comparison: 2/12/2025. Findings: Right lung base demonstrate some interval clearing with persistent perihilar opacities and interstitial prominence suggesting mild edema, with a small left pleural effusion. There is no distinct pneumothorax or new focal lobar consolidation. Unchanged heart and mediastinal contours.     Impression: Impression: Right lung base demonstrate some interval clearing with persistent perihilar opacities and interstitial prominence suggesting mild edema, with a small left pleural effusion. There is no distinct pneumothorax or new focal lobar consolidation. Unchanged heart and mediastinal contours. Electronically Signed: Valente Monge MD  2/14/2025 7:29 AM EST  Workstation ID: [id number]    XR Chest 1 View    Result Date: 2/12/2025  XR CHEST 1 VW Date of Exam: 2/12/2025 10:30 PM EST Indication: hypoxia Comparison: 2/12/2025 Findings: Cardiomediastinal silhouette similar to prior examination. Patient is rotated to the right. Persistent right basal airspace disease and small bilateral pleural effusions. No acute osseous abnormality identified.     Impression: Impression: No significant change Electronically Signed: Mitchell Leary MD  2/12/2025 10:44 PM EST  Workstation ID: [id number]    FL Video Swallow With Speech Single Contrast    Result Date: 2/12/2025  FL VIDEO SWALLOW W SPEECH SINGLE-CONTRAST Date of Exam: 2/12/2025 2:11 PM EST Indication: dysphagia.   Comparison: None available. Technique:   The speech pathologist administered food and/or liquid mixed with barium to the patient with cine/video imaging.  Imaging assistance was provided to the speech pathologist and an image was saved. Fluoroscopic Time: 3  minutes 36 seconds Number of Images: 7 cine loops Findings: The patient was evaluated in the seated lateral position while taking a variety of consistencies by mouth under the direction of speech pathology. There is consistent deep penetration. No definite aspiration was observed. Please see the speech pathologist's procedure report for full details and recommendations.     Impression: Impression: Fluoroscopy provided during modified barium swallow. Electronically Signed: Manoj Hernandez MD  2/12/2025 2:54 PM EST  Workstation ID: [id number]    XR Chest 1 View    Result Date: 2/12/2025  XR CHEST 1 VW Date of Exam: 2/12/2025 12:06 PM EST Indication: worsening oxygen req, flu Comparison: 2/11/2025 Findings: Patient is again rotated to the right. The heart is borderline enlarged. Vasculature appears normal. Mild generalized coarsening of the pulmonary interstitial markings is unchanged but there is mildly increased patchy opacity in the right lung base, which may represent atelectasis or a small focus of developing pneumonia. There appears to be a small right effusion. No pneumothorax is identified. Deformity of the left proximal humerus is consistent with old healed fracture.     Impression: Impression: 1. Mildly increased patchy opacity of the right lung base and suspected small effusion, potentially a developing pneumonia. 2. Stable appearance of the chest elsewhere. Electronically Signed: Manoj Hernanedz MD  2/12/2025 12:30 PM EST  Workstation ID: [id number]         Current medications:  Scheduled Meds:amLODIPine, 2.5 mg, Oral, BID  atorvastatin, 20 mg, Oral, Nightly  azithromycin, 500 mg, Intravenous, Q24H  cefTRIAXone, 1,000 mg, Intravenous, Q24H  donepezil, 5 mg, Oral, Nightly  furosemide, 20 mg, Oral, Daily  guaiFENesin, 600 mg, Oral, Q12H  heparin (porcine), 5,000 Units, Subcutaneous, Q12H  ipratropium-albuterol, 3 mL, Nebulization, 4x Daily - RT  melatonin, 5 mg, Oral, Nightly  methylPREDNISolone sodium succinate, 60 mg,  Intravenous, Q8H  oseltamivir, 30 mg, Oral, Q12H  pantoprazole, 40 mg, Oral, Daily  phenol, 1 spray, Mouth/Throat, BID  sodium chloride, 10 mL, Intravenous, Q12H      Continuous Infusions:Pharmacy to Dose Oseltamivir (TAMIFLU),       PRN Meds:.  acetaminophen **OR** acetaminophen **OR** acetaminophen    senna-docusate sodium **AND** polyethylene glycol **AND** bisacodyl **AND** bisacodyl    Calcium Replacement - Follow Nurse / BPA Driven Protocol    ipratropium-albuterol    Magnesium Standard Dose Replacement - Follow Nurse / BPA Driven Protocol    nitroglycerin    Pharmacy to Dose Oseltamivir (TAMIFLU)    Phosphorus Replacement - Follow Nurse / BPA Driven Protocol    Potassium Replacement - Follow Nurse / BPA Driven Protocol    sodium chloride    sodium chloride    Assessment & Plan   Assessment & Plan     Active Hospital Problems    Diagnosis  POA    **Influenza [J11.1]  Yes    Hypoxia [R09.02]  Unknown    Urinary tract infection, site not specified [N39.0]  Yes      Resolved Hospital Problems   No resolved problems to display.        Brief Hospital Course to date:  Ashley Younger is a 84 y.o. female with PMH significant for dementia, GERD, HLD, HTN presents to [facility] ED for complaint of hypoxia. Patient experiencing flu-like symptoms x 2 days, found to be Influenza A + on arrival. Discovered have UTI as well.     This patient's problems and plans were partially entered by my partner and updated as appropriate by me 02/14/25.       Influenza A + - POA  Hypoxia  Remote hx of COPD  - Patient began exhibiting flu-like symptoms 2 days prior to admission, patient's daughter reports patient has hx of COPD. Patient was never prescribed supplemental O2 however, she reportedly shared supplemental O2 with her family member  - CXR in ED negative for acute process  - Patient is at an increased risk of serious complications from influenza due to her age (> 66 y/o) and PMH significant for dementia, will initiate on Tamiflu x 5  days  - Currently on HFNC, increased requirements since admission  - started on IV steroids- continue while such high oxygen requirements  - patient is DNR/DNI     UTI - POA  - Remote hx of a procedure to reduce occurrence of UTI at [place] per patient's daughter  - U/A in ED + nitrites, trace leuks, 2+ blood, 6-10 WBCs, 4+ bacteria  - Urine cx pending  - has been on rocephin/azithro since admission, continue for now     Dementia  Dysphagia  - Continue Aricept and melatonin  - Modified diet     HTN  - Continue amlodipine and Lasix     HLD  - Continue statin     GERD  - Continue PPI    Have updated dtr dawit as to critically ill status       Expected Discharge Location and Transportation: pending, high oxygen requirements at this time  Expected Discharge   Expected Discharge Date: 2/13/2025; Expected Discharge Time:      VTE Prophylaxis:  Pharmacologic VTE prophylaxis orders are present.         AM-PAC 6 Clicks Score (PT): 9 (02/14/25 0830)    CODE STATUS:   Code Status and Medical Interventions: No CPR (Do Not Attempt to Resuscitate); Limited Support; No intubation (DNI)   Ordered at: 02/11/25 2454     Medical Intervention Limits:    No intubation (DNI)     Level Of Support Discussed With:    Next of Kin (If No Surrogate)     Code Status (Patient has no pulse and is not breathing):    No CPR (Do Not Attempt to Resuscitate)     Medical Interventions (Patient has pulse or is breathing):    Limited Support       Renetta Ulrich MD  02/14/25

## 2025-02-14 NOTE — PROGRESS NOTES
Malnutrition Severity Assessment    Patient Name:  Ashley Younger  YOB: 1940  MRN: [id number]  Admit Date:  2/11/2025    Patient meets criteria for : Moderate (non-severe) Malnutrition (Pt meets criteria for non severe chronic malnutrition based on wasting.)    Comments:      Malnutrition Severity Assessment  Malnutrition Type: Chronic Disease - Related Malnutrition  Malnutrition Type (Last 8 Hours)       Malnutrition Severity Assessment       Row Name 02/14/25 1630       Malnutrition Severity Assessment    Malnutrition Type Chronic Disease - Related Malnutrition      Row Name 02/14/25 1630       Insufficient Energy Intake     Insufficient Energy Intake Findings --  unable to quantify PTA  < 50% in house thus far      Row Name 02/14/25 1630       Unintentional Weight Loss     Unintentional Weight Loss Findings --  apparent loss < criterion      Row Name 02/14/25 1630       Muscle Loss    Loss of Muscle Mass Findings Mild    [place] Region --  mild    Clavicle Bone Region Moderate - some protrusion in females, visible in males    Acromion Bone Region --  mild    Scapular Bone Region --  mild    Dorsal Hand Region --  mild    Patellar Region --  unable to determine at this time    Anterior Thigh Region --  unable to determine at this time    Posterior Calf Region --  unable to determine at this time      Row Name 02/14/25 1630       Fat Loss    Subcutaneous Fat Loss Findings Mild    Orbital Region  --  mild    Upper Arm Region None    Thoracic & Lumbar Region --  mild      Row Name 02/14/25 1630       Criteria Met (Must meet criteria for severity in at least 2 of these categories: M Wasting, Fat Loss, Fluid, Secondary Signs, Wt. Status, Intake)    Patient meets criteria for  Moderate (non-severe) Malnutrition  Pt meets criteria for non severe chronic malnutrition based on wasting.                    Electronically signed by:  Belen Ferrell RD  02/14/25 16:38 EST

## 2025-02-14 NOTE — PLAN OF CARE
Patient alert to self only. VSS. Pt removed from [facility] this afternoon and now using 4L via NC.  Pt maintaining saturation between 93-96% NSR on tele. UOP adequate. Plan of care ongoing.  Droplet precautions maintained

## 2025-02-15 PROBLEM — E44.0 MODERATE PROTEIN-CALORIE MALNUTRITION: Status: ACTIVE | Noted: 2025-02-15

## 2025-02-15 LAB
ALBUMIN SERPL-MCNC: 3.9 G/DL (ref 3.5–5.2)
ALBUMIN/GLOB SERPL: 1.4 G/DL
ALP SERPL-CCNC: 74 U/L (ref 39–117)
ALT SERPL W P-5'-P-CCNC: 9 U/L (ref 1–33)
ANION GAP SERPL CALCULATED.3IONS-SCNC: 12 MMOL/L (ref 5–15)
AST SERPL-CCNC: 17 U/L (ref 1–32)
BASOPHILS # BLD AUTO: 0 10*3/MM3 (ref 0–0.2)
BASOPHILS NFR BLD AUTO: 0 % (ref 0–1.5)
BILIRUB SERPL-MCNC: 0.4 MG/DL (ref 0–1.2)
BUN SERPL-MCNC: 26 MG/DL (ref 8–23)
BUN/CREAT SERPL: 33.8 (ref 7–25)
CALCIUM SPEC-SCNC: 8.4 MG/DL (ref 8.6–10.5)
CHLORIDE SERPL-SCNC: 100 MMOL/L (ref 98–107)
CO2 SERPL-SCNC: 33 MMOL/L (ref 22–29)
CREAT SERPL-MCNC: 0.77 MG/DL (ref 0.57–1)
DEPRECATED RDW RBC AUTO: 49.7 FL (ref 37–54)
EGFRCR SERPLBLD CKD-EPI 2021: 77.1 ML/MIN/1.73
EOSINOPHIL # BLD AUTO: 0 10*3/MM3 (ref 0–0.4)
EOSINOPHIL NFR BLD AUTO: 0 % (ref 0.3–6.2)
ERYTHROCYTE [DISTWIDTH] IN BLOOD BY AUTOMATED COUNT: 13.6 % (ref 12.3–15.4)
GLOBULIN UR ELPH-MCNC: 2.7 GM/DL
GLUCOSE SERPL-MCNC: 179 MG/DL (ref 65–99)
HCT VFR BLD AUTO: 45.6 % (ref 34–46.6)
HGB BLD-MCNC: 14.3 G/DL (ref 12–15.9)
IMM GRANULOCYTES # BLD AUTO: 0.05 10*3/MM3 (ref 0–0.05)
IMM GRANULOCYTES NFR BLD AUTO: 0.6 % (ref 0–0.5)
LYMPHOCYTES # BLD AUTO: 0.77 10*3/MM3 (ref 0.7–3.1)
LYMPHOCYTES NFR BLD AUTO: 8.7 % (ref 19.6–45.3)
MCH RBC QN AUTO: 30.8 PG (ref 26.6–33)
MCHC RBC AUTO-ENTMCNC: 31.4 G/DL (ref 31.5–35.7)
MCV RBC AUTO: 98.1 FL (ref 79–97)
MONOCYTES # BLD AUTO: 0.3 10*3/MM3 (ref 0.1–0.9)
MONOCYTES NFR BLD AUTO: 3.4 % (ref 5–12)
NEUTROPHILS NFR BLD AUTO: 7.72 10*3/MM3 (ref 1.7–7)
NEUTROPHILS NFR BLD AUTO: 87.3 % (ref 42.7–76)
NRBC BLD AUTO-RTO: 0 /100 WBC (ref 0–0.2)
PLATELET # BLD AUTO: 169 10*3/MM3 (ref 140–450)
PMV BLD AUTO: 11.5 FL (ref 6–12)
POTASSIUM SERPL-SCNC: 3.3 MMOL/L (ref 3.5–5.2)
POTASSIUM SERPL-SCNC: 3.7 MMOL/L (ref 3.5–5.2)
PROT SERPL-MCNC: 6.6 G/DL (ref 6–8.5)
RBC # BLD AUTO: 4.65 10*6/MM3 (ref 3.77–5.28)
SODIUM SERPL-SCNC: 145 MMOL/L (ref 136–145)
WBC NRBC COR # BLD AUTO: 8.84 10*3/MM3 (ref 3.4–10.8)

## 2025-02-15 PROCEDURE — 80053 COMPREHEN METABOLIC PANEL: CPT | Performed by: INTERNAL MEDICINE

## 2025-02-15 PROCEDURE — 25010000002 HEPARIN (PORCINE) PER 1000 UNITS

## 2025-02-15 PROCEDURE — 25010000002 AZITHROMYCIN PER 500 MG: Performed by: NURSE PRACTITIONER

## 2025-02-15 PROCEDURE — 99232 SBSQ HOSP IP/OBS MODERATE 35: CPT | Performed by: STUDENT IN AN ORGANIZED HEALTH CARE EDUCATION/TRAINING PROGRAM

## 2025-02-15 PROCEDURE — 25010000002 CEFTRIAXONE PER 250 MG: Performed by: NURSE PRACTITIONER

## 2025-02-15 PROCEDURE — 25010000002 METHYLPREDNISOLONE PER 125 MG: Performed by: NURSE PRACTITIONER

## 2025-02-15 PROCEDURE — 97530 THERAPEUTIC ACTIVITIES: CPT

## 2025-02-15 PROCEDURE — 94799 UNLISTED PULMONARY SVC/PX: CPT

## 2025-02-15 PROCEDURE — 85025 COMPLETE CBC W/AUTO DIFF WBC: CPT | Performed by: INTERNAL MEDICINE

## 2025-02-15 PROCEDURE — 97110 THERAPEUTIC EXERCISES: CPT

## 2025-02-15 PROCEDURE — 84132 ASSAY OF SERUM POTASSIUM: CPT | Performed by: STUDENT IN AN ORGANIZED HEALTH CARE EDUCATION/TRAINING PROGRAM

## 2025-02-15 PROCEDURE — 25810000003 SODIUM CHLORIDE 0.9 % SOLUTION 250 ML FLEX CONT: Performed by: NURSE PRACTITIONER

## 2025-02-15 RX ORDER — QUETIAPINE FUMARATE 25 MG/1
12.5 TABLET, FILM COATED ORAL ONCE
Status: COMPLETED | OUTPATIENT
Start: 2025-02-15 | End: 2025-02-15

## 2025-02-15 RX ORDER — AZITHROMYCIN 250 MG/1
500 TABLET, FILM COATED ORAL
Status: COMPLETED | OUTPATIENT
Start: 2025-02-15 | End: 2025-02-16

## 2025-02-15 RX ORDER — POTASSIUM CHLORIDE 1.5 G/1.58G
40 POWDER, FOR SOLUTION ORAL EVERY 4 HOURS
Status: COMPLETED | OUTPATIENT
Start: 2025-02-15 | End: 2025-02-15

## 2025-02-15 RX ADMIN — FUROSEMIDE 20 MG: 20 TABLET ORAL at 08:13

## 2025-02-15 RX ADMIN — PHENOL 1 SPRAY: 1.5 LIQUID ORAL at 08:30

## 2025-02-15 RX ADMIN — METHYLPREDNISOLONE SODIUM SUCCINATE 60 MG: 125 INJECTION INTRAMUSCULAR; INTRAVENOUS at 08:24

## 2025-02-15 RX ADMIN — AMLODIPINE BESYLATE 2.5 MG: 2.5 TABLET ORAL at 08:13

## 2025-02-15 RX ADMIN — QUETIAPINE FUMARATE 12.5 MG: 25 TABLET ORAL at 20:39

## 2025-02-15 RX ADMIN — METHYLPREDNISOLONE SODIUM SUCCINATE 60 MG: 125 INJECTION INTRAMUSCULAR; INTRAVENOUS at 01:40

## 2025-02-15 RX ADMIN — Medication 10 ML: at 09:28

## 2025-02-15 RX ADMIN — METHYLPREDNISOLONE SODIUM SUCCINATE 60 MG: 125 INJECTION INTRAMUSCULAR; INTRAVENOUS at 16:22

## 2025-02-15 RX ADMIN — IPRATROPIUM BROMIDE AND ALBUTEROL SULFATE 3 ML: 2.5; .5 SOLUTION RESPIRATORY (INHALATION) at 11:05

## 2025-02-15 RX ADMIN — HEPARIN SODIUM 5000 UNITS: 5000 INJECTION INTRAVENOUS; SUBCUTANEOUS at 08:14

## 2025-02-15 RX ADMIN — PHENOL 1 SPRAY: 1.5 LIQUID ORAL at 20:42

## 2025-02-15 RX ADMIN — PANTOPRAZOLE SODIUM 40 MG: 40 TABLET, DELAYED RELEASE ORAL at 08:56

## 2025-02-15 RX ADMIN — DONEPEZIL HYDROCHLORIDE 5 MG: 5 TABLET ORAL at 20:39

## 2025-02-15 RX ADMIN — POTASSIUM CHLORIDE 40 MEQ: 1.5 POWDER, FOR SOLUTION ORAL at 14:16

## 2025-02-15 RX ADMIN — ATORVASTATIN CALCIUM 20 MG: 20 TABLET, FILM COATED ORAL at 20:40

## 2025-02-15 RX ADMIN — AZITHROMYCIN DIHYDRATE 500 MG: 500 INJECTION, POWDER, LYOPHILIZED, FOR SOLUTION INTRAVENOUS at 05:33

## 2025-02-15 RX ADMIN — GUAIFENESIN 600 MG: 600 TABLET ORAL at 08:13

## 2025-02-15 RX ADMIN — SODIUM CHLORIDE 1000 MG: 900 INJECTION INTRAVENOUS at 05:29

## 2025-02-15 RX ADMIN — Medication 5 MG: at 20:39

## 2025-02-15 RX ADMIN — IPRATROPIUM BROMIDE AND ALBUTEROL SULFATE 3 ML: 2.5; .5 SOLUTION RESPIRATORY (INHALATION) at 16:10

## 2025-02-15 RX ADMIN — ACETAMINOPHEN 650 MG: 325 TABLET ORAL at 10:21

## 2025-02-15 RX ADMIN — IPRATROPIUM BROMIDE AND ALBUTEROL SULFATE 3 ML: 2.5; .5 SOLUTION RESPIRATORY (INHALATION) at 07:01

## 2025-02-15 RX ADMIN — POTASSIUM CHLORIDE 40 MEQ: 1.5 POWDER, FOR SOLUTION ORAL at 09:30

## 2025-02-15 RX ADMIN — AZITHROMYCIN DIHYDRATE 500 MG: 250 TABLET ORAL at 12:18

## 2025-02-15 RX ADMIN — HEPARIN SODIUM 5000 UNITS: 5000 INJECTION INTRAVENOUS; SUBCUTANEOUS at 20:40

## 2025-02-15 RX ADMIN — AMLODIPINE BESYLATE 2.5 MG: 2.5 TABLET ORAL at 20:40

## 2025-02-15 RX ADMIN — GUAIFENESIN 600 MG: 600 TABLET ORAL at 20:39

## 2025-02-15 RX ADMIN — OSELTAMIVIR PHOSPHATE 30 MG: 30 CAPSULE ORAL at 20:40

## 2025-02-15 RX ADMIN — OSELTAMIVIR PHOSPHATE 30 MG: 30 CAPSULE ORAL at 08:14

## 2025-02-15 NOTE — NURSING NOTE
Patient alert to self, vss, 4L Nasal Cannula. Patient pleasantly confused. Droplet precautions maintained for influenza.

## 2025-02-15 NOTE — PLAN OF CARE
Goal Outcome Evaluation:  Plan of Care Reviewed With: patient           Outcome Evaluation: Patient confused and needs frequent redirection. She was able to participate in therapeutic activities and exercises, including walking short distance with walker. Good effort today.    Anticipated Discharge Disposition (PT): home with 24/7 care, home with home health

## 2025-02-15 NOTE — THERAPY TREATMENT NOTE
Patient Name: Ashley Younger  : 1940    MRN: [id number]                              Today's Date: 2/15/2025       Admit Date: 2025    Visit Dx:     ICD-10-CM ICD-9-CM   1. Hypoxia  R09.02 799.02   2. Influenza A  J10.1 487.1   3. Acute UTI (urinary tract infection)  N39.0 599.0   4. Oropharyngeal dysphagia  R13.12 787.22     Patient Active Problem List   Diagnosis    Common bile duct (CBD) obstruction    Hypertension    Sepsis    Fracture of left shoulder girdle, part unspecified, subsequent encounter for fracture with routine healing    Muscle weakness (generalized)    Urinary tract infection, site not specified    Wedge compression fracture of t5-T6 vertebra, subsequent encounter for fracture with routine healing    Encephalopathy, unspecified    Fall at home    Dehydration    Cholangitis    Influenza    Hypoxia    Moderate protein-calorie malnutrition     Past Medical History:   Diagnosis Date    BENJIE (acute kidney injury)     Arthritis     Cholelithiasis     Chronic kidney disease, stage 3 unspecified     DJD (degenerative joint disease)     Encounter for removal of sutures     GERD (gastroesophageal reflux disease)     Headache     History of  brain aneurysm     Hyperlipidemia     Hypertension     Orthostatic hypotension     Osteoarthritis     Presence of neurostimulator     Recurrent falls     Renal insufficiency     Scalp laceration     Urinary tract infection      Past Surgical History:   Procedure Laterality Date    CHOLECYSTECTOMY      CRANIAL NEUROSTIMULATOR INSERTION/REPLACEMENT      TUMOR    ERCP      WITH STONE REMOVED    ORIF WRIST FRACTURE Left       General Information       Row Name 02/15/25 1600          Physical Therapy Time and Intention    Document Type therapy note (daily note)  -SC     Mode of Treatment physical therapy  -SC       Row Name 02/15/25 1600          General Information    Patient Profile Reviewed yes  -SC     Existing Precautions/Restrictions fall;oxygen therapy  device and L/min  dementia. incontinence  -SC       Row Name 02/15/25 1600          Cognition    Orientation Status (Cognition) oriented to;person  -SC       Row Name 02/15/25 1600          Safety Issues/Impairments Affecting Functional Mobility    Impairments Affecting Function (Mobility) cognition;balance;endurance/activity tolerance;postural/trunk control;strength;range of motion (ROM)  -SC     Comment, Safety Issues/Impairments (Mobility) alert, following commands, confused  -SC               User Key  (r) = Recorded By, (t) = Taken By, (c) = Cosigned By      Initials Name Provider Type    SC Jovani Sheth, PT Physical Therapist                   Mobility       Row Name 02/15/25 1601          Bed Mobility    Bed Mobility supine-sit;scooting/bridging  -SC     Scooting/Bridging [place] (Bed Mobility) moderate assist (50% patient effort);maximum assist (25% patient effort)  -SC     Supine-Sit [place] (Bed Mobility) minimum assist (75% patient effort)  -SC     Assistive Device (Bed Mobility) repositioning sheet;head of bed elevated  -SC     Comment, (Bed Mobility) encouraged to get up to edge of bed . Patient able to use hand rail and get legs off bed. Assistance needed for scooting  -[facility] Name 02/15/25 1601          Transfers    Comment, (Transfers) STS x3 from EOB with time taken to improve standing balance. Initial posterior lean, but improved with time  -[facility] Name 02/15/25 1601          Bed-Chair Transfer    Bed-Chair [place] (Transfers) 2 person assist;moderate assist (50% patient effort)  -SC     Assistive Device (Bed-Chair Transfers) walker, front-wheeled  -SC     Comment, (Bed-Chair Transfer) assist taking a few steps over to recliner  -[facility] Name 02/15/25 1601          Sit-Stand Transfer    Sit-Stand [place] (Transfers) 1 person assist;1 person to manage equipment;minimum assist (75% patient effort)  -SC     Assistive Device (Sit-Stand Transfers) walker,  front-wheeled  -SC       Row Name 02/15/25 1601          Gait/Stairs (Locomotion)    [place] Level (Gait) 2 person assist;moderate assist (50% patient effort)  -SC     Assistive Device (Gait) walker, front-wheeled  -SC     Distance in Feet (Gait) 6  -SC     Deviations/Abnormal Patterns (Gait) stride length decreased;weight shifting decreased;festinating/shuffling  -SC     Bilateral Gait Deviations forward flexed posture  -SC     Comment, (Gait/Stairs) PT required to control walker at all times. Encouraged staying closer to walker. Unsteady  -SC               User Key  (r) = Recorded By, (t) = Taken By, (c) = Cosigned By      Initials Name Provider Type    SC Jovani Sheth, PT Physical Therapist                   Obj/Interventions       Row Name 02/15/25 1604          Motor Skills    Therapeutic Exercise hip;knee  -[facility] Name 02/15/25 1604          Hip (Therapeutic Exercise)    Hip (Therapeutic Exercise) AAROM (active assistive range of motion)  -SC     Hip AAROM (Therapeutic Exercise) bilateral;flexion;extension;10 repetitions  -[facility] Name 02/15/25 1604          Knee (Therapeutic Exercise)    Knee (Therapeutic Exercise) AAROM (active assistive range of motion);strengthening exercise  -SC     Knee AAROM (Therapeutic Exercise) bilateral;flexion;extension;sitting  -SC     Knee Strengthening (Therapeutic Exercise) bilateral;SLR (straight leg raise);10 repetitions;supine  -[facility] Name 02/15/25 1604          Balance    Balance Assessment standing dynamic balance  -SC     Dynamic Standing Balance moderate assist;2-person assist  -SC     Position/Device Used, Standing Balance supported;walker, rolling  -SC     Comment, Balance unsteady  -SC               User Key  (r) = Recorded By, (t) = Taken By, (c) = Cosigned By      Initials Name Provider Type    SC Jovani Sheth, PT Physical Therapist                   Goals/Plan    No documentation.                  Clinical Impression       Row Name  02/15/25 1605          Pain    Pretreatment Pain Rating 0/10 - no pain  -SC     Posttreatment Pain Rating 0/10 - no pain  -SC       Row Name 02/15/25 1605          Plan of Care Review    Plan of Care Reviewed With patient  -SC     Outcome Evaluation Patient confused and needs frequent redirection. She was able to participate in therapeutic activities and exercises, including walking short distance with walker. Good effort today.  -SC       Row Name 02/15/25 1605          Therapy Assessment/Plan (PT)    Patient/Family Therapy Goals Statement (PT) did not state  -SC     Rehab Potential (PT) good  -SC     Criteria for Skilled Interventions Met (PT) yes;meets criteria;skilled treatment is necessary  -SC     Therapy Frequency (PT) daily  -SC       Row Name 02/15/25 1605          Positioning and Restraints    Pre-Treatment Position in bed  -SC     Post Treatment Position chair  -SC     In Chair notified nsg;reclined;sitting;call light within reach;encouraged to call for assist;exit alarm on;with nsg  -SC               User Key  (r) = Recorded By, (t) = Taken By, (c) = Cosigned By      Initials Name Provider Type    Jovani Rashid, PT Physical Therapist                   Outcome Measures       Row Name 02/15/25 1607 02/15/25 0800       How much help from another person do you currently need...    Turning from your back to your side while in flat bed without using bedrails? 3  -SC 3  -LR    Moving from lying on back to sitting on the side of a flat bed without bedrails? 3  -SC 2  -LR    Moving to and from a bed to a chair (including a wheelchair)? 2  -SC 1  -LR    Standing up from a chair using your arms (e.g., wheelchair, bedside chair)? 2  -SC 1  -LR    Climbing 3-5 steps with a railing? 1  -SC 1  -LR    To walk in hospital room? 2  -SC 1  -LR    AM-PAC 6 Clicks Score (PT) 13  -SC 9  -LR    Highest Level of Mobility Goal 4 --> Transfer to chair/commode  -SC 3 --> Sit at edge of bed  -LR      Row Name 02/15/25 1600           Functional Assessment    Outcome Measure Options AM-PAC 6 Clicks Basic Mobility (PT)  -SC               User Key  (r) = Recorded By, (t) = Taken By, (c) = Cosigned By      Initials Name Provider Type    SC Jovani Sheth, PT Physical Therapist    Emelina Iniguez, RN Registered Nurse                                 Physical Therapy Education       Title: PT OT SLP Therapies (In Progress)       Topic: Physical Therapy (Done)       Point: Mobility training (Done)       Learning Progress Summary            Patient Eager, E, VU,DU by SC at 2/15/2025 1608    Comment: reviewed benefitrs of getting oob    Eager, E, VU,DU,NR by  at 2/12/2025 1350    Comment: Educated pt. safety/technique w/bed mobility, transfers, PT POC                      Point: Home exercise program (Done)       Learning Progress Summary            Patient Eager, E, VU,DU by SC at 2/15/2025 1608    Comment: reviewed benefitrs of getting oob                      Point: Body mechanics (Done)       Learning Progress Summary            Patient Eager, E, VU,DU by SC at 2/15/2025 1608    Comment: reviewed benefitrs of getting oob    Eager, E, VU,DU,NR by  at 2/12/2025 1350    Comment: Educated pt. safety/technique w/bed mobility, transfers, PT POC                      Point: Precautions (Done)       Learning Progress Summary            Patient Eager, E, VU,DU by SC at 2/15/2025 1608    Comment: reviewed benefitrs of getting oob    Eager, E, VU,DU,NR by  at 2/12/2025 1350    Comment: Educated pt. safety/technique w/bed mobility, transfers, PT POC                                      User Key       Initials Effective Dates Name Provider Type Discipline    SC 02/03/23 -  Jovani Sheth, PT Physical Therapist PT     06/01/21 -  Renetta Quiñonez PT Physical Therapist PT                  PT Recommendation and Plan     Outcome Evaluation: Patient confused and needs frequent redirection. She was able to participate in therapeutic activities and  exercises, including walking short distance with walker. Good effort today.     Time Calculation:         PT Charges       Row Name 02/15/25 1524             Time Calculation    Start Time 1524  -SC      PT Received On 02/15/25  -SC      PT Goal Re-Cert Due Date 02/22/25  -SC         Timed Charges    36652 - PT Therapeutic Exercise Minutes 8  -SC      76959 - Gait Training Minutes  3  -SC      11319 - PT Therapeutic Activity Minutes 15  -SC         Total Minutes    Timed Charges Total Minutes 26  -SC       Total Minutes 26  -SC                User Key  (r) = Recorded By, (t) = Taken By, (c) = Cosigned By      Initials Name Provider Type    SC Jovani Sheth PT Physical Therapist                  Therapy Charges for Today       Code Description Service Date Service Provider Modifiers Qty    30091550905 HC PT THER PROC EA 15 MIN 2/15/2025 Jovani Sheth PT GP 1    88739539251 HC PT THERAPEUTIC ACT EA 15 MIN 2/15/2025 Jovani Sheth PT GP 1            PT G-Codes  Outcome Measure Options: AM-PAC 6 Clicks Basic Mobility (PT)  AM-PAC 6 Clicks Score (PT): 13  AM-PAC 6 Clicks Score (OT): 9  PT Discharge Summary  Anticipated Discharge Disposition (PT): home with 24/7 care, home with home health    Jovani Sheth PT  2/15/2025

## 2025-02-15 NOTE — PLAN OF CARE
"Worked with PT today. Potassium was mildly low; received 40 mg PO replacement x2. Follow-up labs pending.    Small bowel movement noted. Plan of care ongoing.    Last set of vitals: /74 (BP Location: Right arm, Patient Position: Lying)   Pulse 88   Temp 97.5 °F (36.4 °C) (Oral)   Resp 18   Ht 167.6 cm (66\")   Wt 54.4 kg (120 lb)   SpO2 95%   BMI 19.37 kg/m²       "

## 2025-02-15 NOTE — PROGRESS NOTES
[facility] Medicine Services  PROGRESS NOTE    Patient Name: Ashley Younger  : 1940  MRN: [id number]    Date of Admission: 2025  Primary Care Physician: John Chen MD    Subjective   Subjective     CC:  Influenza/hypoxia    HPI:  She feels much improved.  Is now off of high flow and working well with her incentive spirometer.  She is pleased with her progress      Objective   Objective     Vital Signs:   Temp:  [96.5 °F (35.8 °C)-98.6 °F (37 °C)] 98.6 °F (37 °C)  Heart Rate:  [71-91] 91  Resp:  [16-18] 18  BP: (107-131)/(57-84) 125/57  Flow (L/min) (Oxygen Therapy):  [4-50] 4     Physical Exam:  GEN: NAD, resting in bed, awake  NECK: supple, no masses  RESP: on 4 L nasal cannula.  Fair respiratory effort, working with incentive spirometer.  No wheezes  CV: on tele, sinus rhythm  PSYCH: normal affect, appropriate  NEURO: awake, alert, no focal deficits noted  MSK: no edema noted  SKIN: no rashes noted       Results Reviewed:  LAB RESULTS:      Lab 02/15/25  0809 25  01225  0834 25  1148   WBC 8.84 9.44 6.35 4.84   HEMOGLOBIN 14.3 14.2 13.5 13.2   HEMATOCRIT 45.6 45.4 43.4 42.2   PLATELETS 169 160 165 164   NEUTROS ABS 7.72* 6.66 3.79 2.63   IMMATURE GRANS (ABS) 0.05 0.02 0.02 0.01   LYMPHS ABS 0.77 1.83 1.50 1.27   MONOS ABS 0.30 0.90 0.99* 0.89   EOS ABS 0.00 0.01 0.03 0.02   MCV 98.1* 99.6* 99.5* 99.1*   PROCALCITONIN  --  0.07  --   --    LACTATE  --  1.5  --  0.7         Lab 02/15/25  0809 25  0127 25  0834 25  1148   SODIUM 145 139 142 141   POTASSIUM 3.3* 3.9 4.0 3.9   CHLORIDE 100 99 100 103   CO2 33.0* 26.0 31.0* 30.0*   ANION GAP 12.0 14.0 11.0 8.0   BUN 26* 23 21 18   CREATININE 0.77 0.86 0.94 0.75   EGFR 77.1 67.5 60.7 79.6   GLUCOSE 179* 109* 89 104*   CALCIUM 8.4* 8.3* 8.2* 8.3*   MAGNESIUM  --   --  1.9 1.9   PHOSPHORUS  --   --  3.8  --          Lab 02/15/25  0809 25  0127 25  0834 25  1148    TOTAL PROTEIN 6.6 6.7 6.5 6.7   ALBUMIN 3.9 3.6 3.8 3.9   GLOBULIN 2.7 3.1 2.7 2.8   ALT (SGPT) 9 9 12 9   AST (SGOT) 17 25 30 31   BILIRUBIN 0.4 0.5 0.5 0.4   ALK PHOS 74 73 72 76                     Lab 02/13/25  0128   PH, ARTERIAL 7.456*   PCO2, ARTERIAL 46.7*   PO2 ART 75.2*   FIO2 90   HCO3 ART 32.9*   BASE EXCESS ART 7.8*   CARBOXYHEMOGLOBIN 0.8     Brief Urine Lab Results  (Last result in the past 365 days)        Color   Clarity   Blood   Leuk Est   Nitrite   Protein   CREAT   Urine HCG        02/12/25 1558 Dark Yellow   Turbid   Large (3+)   Moderate (2+)   Positive   Trace                   Microbiology Results Abnormal       Procedure Component Value - Date/Time    COVID PRE-OP / PRE-PROCEDURE SCREENING ORDER (NO ISOLATION) - Swab, Nasopharynx [[id number]]  (Abnormal) Collected: 02/11/25 1128    Lab Status: Final result Specimen: Swab from Nasopharynx Updated: 02/11/25 1346    Narrative:      The following orders were created for panel order COVID PRE-OP / PRE-PROCEDURE SCREENING ORDER (NO ISOLATION) - Swab, Nasopharynx.  Procedure                               Abnormality         Status                     ---------                               -----------         ------                     COVID-19, FLU A/B, RSV P...[[id number]]  Abnormal            Final result                 Please view results for these tests on the individual orders.    COVID-19, FLU A/B, RSV PCR 1 HR TAT - Swab, Nasopharynx [[id number]]  (Abnormal) Collected: 02/11/25 1128    Lab Status: Final result Specimen: Swab from Nasopharynx Updated: 02/11/25 1346     COVID19 Not Detected     Influenza A PCR Detected     Influenza B PCR Not Detected     RSV, PCR Not Detected    Narrative:      Fact sheet for providers: https://www.fda.gov/media/306370/download    Fact sheet for patients: https://www.fda.gov/media/679989/download    Test performed by PCR.            XR Chest 1 View    Result Date: 2/14/2025  XR CHEST 1 VW Date of Exam:  2/14/2025 2:12 AM EST Indication: hypoxia/ flu Comparison: 2/12/2025. Findings: Right lung base demonstrate some interval clearing with persistent perihilar opacities and interstitial prominence suggesting mild edema, with a small left pleural effusion. There is no distinct pneumothorax or new focal lobar consolidation. Unchanged heart and mediastinal contours.     Impression: Impression: Right lung base demonstrate some interval clearing with persistent perihilar opacities and interstitial prominence suggesting mild edema, with a small left pleural effusion. There is no distinct pneumothorax or new focal lobar consolidation. Unchanged heart and mediastinal contours. Electronically Signed: Valente Monge MD  2/14/2025 7:29 AM EST  Workstation ID: [id number]         Current medications:  Scheduled Meds:amLODIPine, 2.5 mg, Oral, BID  atorvastatin, 20 mg, Oral, Nightly  azithromycin, 500 mg, Oral, Q24H  cefTRIAXone, 1,000 mg, Intravenous, Q24H  donepezil, 5 mg, Oral, Nightly  furosemide, 20 mg, Oral, Daily  guaiFENesin, 600 mg, Oral, Q12H  heparin (porcine), 5,000 Units, Subcutaneous, Q12H  ipratropium-albuterol, 3 mL, Nebulization, 4x Daily - RT  melatonin, 5 mg, Oral, Nightly  methylPREDNISolone sodium succinate, 60 mg, Intravenous, Q8H  oseltamivir, 30 mg, Oral, Q12H  pantoprazole, 40 mg, Oral, Daily  phenol, 1 spray, Mouth/Throat, BID  potassium chloride, 40 mEq, Oral, Q4H  sodium chloride, 10 mL, Intravenous, Q12H      Continuous Infusions:Pharmacy to Dose Oseltamivir (TAMIFLU),       PRN Meds:.  acetaminophen **OR** acetaminophen **OR** acetaminophen    senna-docusate sodium **AND** polyethylene glycol **AND** bisacodyl **AND** bisacodyl    Calcium Replacement - Follow Nurse / BPA Driven Protocol    ipratropium-albuterol    Magnesium Standard Dose Replacement - Follow Nurse / BPA Driven Protocol    nitroglycerin    Pharmacy to Dose Oseltamivir (TAMIFLU)    Phosphorus Replacement - Follow Nurse / BPA Driven  Protocol    Potassium Replacement - Follow Nurse / BPA Driven Protocol    sodium chloride    sodium chloride    Assessment & Plan   Assessment & Plan     Active Hospital Problems    Diagnosis  POA    **Influenza [J11.1]  Yes    Hypoxia [R09.02]  Unknown    Urinary tract infection, site not specified [N39.0]  Yes      Resolved Hospital Problems   No resolved problems to display.        Brief Hospital Course to date:  Ashley Younger is a 84 y.o. female with PMH significant for dementia, GERD, HLD, HTN presents to [facility] ED for complaint of hypoxia. Patient experiencing flu-like symptoms x 2 days, found to be Influenza A + on arrival. Discovered have UTI as well.     This patient's problems and plans were partially entered by my partner and updated as appropriate by me 02/15/25.       Influenza A + - POA  Hypoxia  Remote hx of COPD  - Patient began exhibiting flu-like symptoms 2 days prior to admission, patient's daughter reports patient has hx of COPD. Patient was never prescribed supplemental O2 however, she reportedly shared supplemental O2 with her family member  - CXR in ED negative for acute process  - Patient is at an increased risk of serious complications from influenza due to her age (> 66 y/o) and PMH significant for dementia, initiated on Tamiflu x 5 days  -Initially required high flow but now weaned to 4 L nasal cannula  - started on IV steroids- continue while such high oxygen requirements.  If continues to wean down from oxygen requirements will start weaning steroids  - Complete 5-day course of azithromycin  - patient is DNR/DNI     UTI - POA  - Remote hx of a procedure to reduce occurrence of UTI at [employer] per patient's daughter  - U/A in ED + nitrites, trace leuks, 2+ blood, 6-10 WBCs, 4+ bacteria  - Urine cx pending  - has been on rocephin/azithro since admission, continue for now     Dementia  Dysphagia  - Continue Aricept and melatonin  - Modified diet  --complicates all aspects of care     HTN  -  Continue amlodipine and Lasix     HLD  - Continue statin     GERD  - Continue PPI          Expected Discharge Location and Transportation: pending, high oxygen requirements at this time  Expected Discharge   Expected Discharge Date: 2/17/2025; Expected Discharge Time:      VTE Prophylaxis:  Pharmacologic VTE prophylaxis orders are present.         AM-PAC 6 Clicks Score (PT): 9 (02/15/25 0800)    CODE STATUS:   Code Status and Medical Interventions: No CPR (Do Not Attempt to Resuscitate); Limited Support; No intubation (DNI)   Ordered at: 02/11/25 8539     Medical Intervention Limits:    No intubation (DNI)     Level Of Support Discussed With:    Next of Kin (If No Surrogate)     Code Status (Patient has no pulse and is not breathing):    No CPR (Do Not Attempt to Resuscitate)     Medical Interventions (Patient has pulse or is breathing):    Limited Support       Estela Pérez MD  02/15/25

## 2025-02-16 PROCEDURE — 99232 SBSQ HOSP IP/OBS MODERATE 35: CPT | Performed by: STUDENT IN AN ORGANIZED HEALTH CARE EDUCATION/TRAINING PROGRAM

## 2025-02-16 PROCEDURE — 25010000002 HEPARIN (PORCINE) PER 1000 UNITS

## 2025-02-16 PROCEDURE — 94799 UNLISTED PULMONARY SVC/PX: CPT

## 2025-02-16 PROCEDURE — 25010000002 METHYLPREDNISOLONE PER 125 MG: Performed by: NURSE PRACTITIONER

## 2025-02-16 PROCEDURE — 25010000002 CEFTRIAXONE PER 250 MG: Performed by: NURSE PRACTITIONER

## 2025-02-16 RX ORDER — METHYLPREDNISOLONE SODIUM SUCCINATE 125 MG/2ML
60 INJECTION, POWDER, LYOPHILIZED, FOR SOLUTION INTRAMUSCULAR; INTRAVENOUS DAILY
Status: DISCONTINUED | OUTPATIENT
Start: 2025-02-17 | End: 2025-02-17 | Stop reason: HOSPADM

## 2025-02-16 RX ADMIN — Medication 10 ML: at 09:32

## 2025-02-16 RX ADMIN — METHYLPREDNISOLONE SODIUM SUCCINATE 60 MG: 125 INJECTION INTRAMUSCULAR; INTRAVENOUS at 09:29

## 2025-02-16 RX ADMIN — AMLODIPINE BESYLATE 2.5 MG: 2.5 TABLET ORAL at 20:43

## 2025-02-16 RX ADMIN — METHYLPREDNISOLONE SODIUM SUCCINATE 60 MG: 125 INJECTION INTRAMUSCULAR; INTRAVENOUS at 00:27

## 2025-02-16 RX ADMIN — Medication 10 ML: at 20:50

## 2025-02-16 RX ADMIN — HEPARIN SODIUM 5000 UNITS: 5000 INJECTION INTRAVENOUS; SUBCUTANEOUS at 09:29

## 2025-02-16 RX ADMIN — IPRATROPIUM BROMIDE AND ALBUTEROL SULFATE 3 ML: 2.5; .5 SOLUTION RESPIRATORY (INHALATION) at 15:02

## 2025-02-16 RX ADMIN — Medication 5 MG: at 20:44

## 2025-02-16 RX ADMIN — ATORVASTATIN CALCIUM 20 MG: 20 TABLET, FILM COATED ORAL at 20:44

## 2025-02-16 RX ADMIN — PANTOPRAZOLE SODIUM 40 MG: 40 TABLET, DELAYED RELEASE ORAL at 09:30

## 2025-02-16 RX ADMIN — GUAIFENESIN 600 MG: 600 TABLET ORAL at 09:30

## 2025-02-16 RX ADMIN — DONEPEZIL HYDROCHLORIDE 5 MG: 5 TABLET ORAL at 20:43

## 2025-02-16 RX ADMIN — HEPARIN SODIUM 5000 UNITS: 5000 INJECTION INTRAVENOUS; SUBCUTANEOUS at 20:44

## 2025-02-16 RX ADMIN — AZITHROMYCIN DIHYDRATE 500 MG: 250 TABLET ORAL at 09:30

## 2025-02-16 RX ADMIN — GUAIFENESIN 600 MG: 600 TABLET ORAL at 20:44

## 2025-02-16 RX ADMIN — OSELTAMIVIR PHOSPHATE 30 MG: 30 CAPSULE ORAL at 09:30

## 2025-02-16 RX ADMIN — AMLODIPINE BESYLATE 2.5 MG: 2.5 TABLET ORAL at 09:30

## 2025-02-16 RX ADMIN — FUROSEMIDE 20 MG: 20 TABLET ORAL at 09:30

## 2025-02-16 RX ADMIN — PHENOL 1 SPRAY: 1.5 LIQUID ORAL at 09:32

## 2025-02-16 RX ADMIN — SODIUM CHLORIDE 1000 MG: 900 INJECTION INTRAVENOUS at 05:17

## 2025-02-16 RX ADMIN — IPRATROPIUM BROMIDE AND ALBUTEROL SULFATE 3 ML: 2.5; .5 SOLUTION RESPIRATORY (INHALATION) at 18:40

## 2025-02-16 NOTE — PROGRESS NOTES
[facility] Medicine Services  PROGRESS NOTE    Patient Name: Ashley Younger  : 1940  MRN: [id number]    Date of Admission: 2025  Primary Care Physician: John Chen MD    Subjective   Subjective     CC:  Influenza/hypoxia    HPI:  No new overnight events.  Continues to do well.  Denies any respiratory complaints to me      Objective   Objective     Vital Signs:   Temp:  [97.3 °F (36.3 °C)-98 °F (36.7 °C)] 97.3 °F (36.3 °C)  Heart Rate:  [69-97] 76  Resp:  [18-20] 18  BP: (117-139)/(74-92) 131/92  Flow (L/min) (Oxygen Therapy):  [4] 4     Physical Exam:  GEN: NAD, resting in bed, awake  NECK: supple, no masses  RESP: Nasal cannula is off to the side, fair respiratory effort, clear anteriorly to auscultation  CV: on tele, sinus rhythm  PSYCH: normal affect, appropriate  NEURO: awake, alert, no focal deficits noted  MSK: no edema noted  SKIN: no rashes noted       Results Reviewed:  LAB RESULTS:      Lab 02/15/25  0809 25  0127 25  0834 25  1148   WBC 8.84 9.44 6.35 4.84   HEMOGLOBIN 14.3 14.2 13.5 13.2   HEMATOCRIT 45.6 45.4 43.4 42.2   PLATELETS 169 160 165 164   NEUTROS ABS 7.72* 6.66 3.79 2.63   IMMATURE GRANS (ABS) 0.05 0.02 0.02 0.01   LYMPHS ABS 0.77 1.83 1.50 1.27   MONOS ABS 0.30 0.90 0.99* 0.89   EOS ABS 0.00 0.01 0.03 0.02   MCV 98.1* 99.6* 99.5* 99.1*   PROCALCITONIN  --  0.07  --   --    LACTATE  --  1.5  --  0.7         Lab 02/15/25  1657 02/15/25  0809 25  0127 25  0834 25  1148   SODIUM  --  145 139 142 141   POTASSIUM 3.7 3.3* 3.9 4.0 3.9   CHLORIDE  --  100 99 100 103   CO2  --  33.0* 26.0 31.0* 30.0*   ANION GAP  --  12.0 14.0 11.0 8.0   BUN  --  26* 23 21 18   CREATININE  --  0.77 0.86 0.94 0.75   EGFR  --  77.1 67.5 60.7 79.6   GLUCOSE  --  179* 109* 89 104*   CALCIUM  --  8.4* 8.3* 8.2* 8.3*   MAGNESIUM  --   --   --  1.9 1.9   PHOSPHORUS  --   --   --  3.8  --          Lab 02/15/25  0809 25  0127  02/12/25  0834 02/11/25  1148   TOTAL PROTEIN 6.6 6.7 6.5 6.7   ALBUMIN 3.9 3.6 3.8 3.9   GLOBULIN 2.7 3.1 2.7 2.8   ALT (SGPT) 9 9 12 9   AST (SGOT) 17 25 30 31   BILIRUBIN 0.4 0.5 0.5 0.4   ALK PHOS 74 73 72 76                     Lab 02/13/25  0128   PH, ARTERIAL 7.456*   PCO2, ARTERIAL 46.7*   PO2 ART 75.2*   FIO2 90   HCO3 ART 32.9*   BASE EXCESS ART 7.8*   CARBOXYHEMOGLOBIN 0.8     Brief Urine Lab Results  (Last result in the past 365 days)        Color   Clarity   Blood   Leuk Est   Nitrite   Protein   CREAT   Urine HCG        02/12/25 1558 Dark Yellow   Turbid   Large (3+)   Moderate (2+)   Positive   Trace                   Microbiology Results Abnormal       Procedure Component Value - Date/Time    COVID PRE-OP / PRE-PROCEDURE SCREENING ORDER (NO ISOLATION) - Swab, Nasopharynx [[id number]]  (Abnormal) Collected: 02/11/25 1128    Lab Status: Final result Specimen: Swab from Nasopharynx Updated: 02/11/25 1346    Narrative:      The following orders were created for panel order COVID PRE-OP / PRE-PROCEDURE SCREENING ORDER (NO ISOLATION) - Swab, Nasopharynx.  Procedure                               Abnormality         Status                     ---------                               -----------         ------                     COVID-19, FLU A/B, RSV P...[[id number]]  Abnormal            Final result                 Please view results for these tests on the individual orders.    COVID-19, FLU A/B, RSV PCR 1 HR TAT - Swab, Nasopharynx [[id number]]  (Abnormal) Collected: 02/11/25 1128    Lab Status: Final result Specimen: Swab from Nasopharynx Updated: 02/11/25 1346     COVID19 Not Detected     Influenza A PCR Detected     Influenza B PCR Not Detected     RSV, PCR Not Detected    Narrative:      Fact sheet for providers: https://www.fda.gov/media/707733/download    Fact sheet for patients: https://www.fda.gov/media/996481/download    Test performed by PCR.            No radiology results from the last 24  hrs        Current medications:  Scheduled Meds:amLODIPine, 2.5 mg, Oral, BID  atorvastatin, 20 mg, Oral, Nightly  cefTRIAXone, 1,000 mg, Intravenous, Q24H  donepezil, 5 mg, Oral, Nightly  furosemide, 20 mg, Oral, Daily  guaiFENesin, 600 mg, Oral, Q12H  heparin (porcine), 5,000 Units, Subcutaneous, Q12H  ipratropium-albuterol, 3 mL, Nebulization, 4x Daily - RT  melatonin, 5 mg, Oral, Nightly  [START ON 2/17/2025] methylPREDNISolone sodium succinate, 60 mg, Intravenous, Daily  pantoprazole, 40 mg, Oral, Daily  phenol, 1 spray, Mouth/Throat, BID  sodium chloride, 10 mL, Intravenous, Q12H      Continuous Infusions:Pharmacy to Dose Oseltamivir (TAMIFLU),       PRN Meds:.  acetaminophen **OR** acetaminophen **OR** acetaminophen    senna-docusate sodium **AND** polyethylene glycol **AND** bisacodyl **AND** bisacodyl    Calcium Replacement - Follow Nurse / BPA Driven Protocol    ipratropium-albuterol    Magnesium Standard Dose Replacement - Follow Nurse / BPA Driven Protocol    nitroglycerin    Pharmacy to Dose Oseltamivir (TAMIFLU)    Phosphorus Replacement - Follow Nurse / BPA Driven Protocol    Potassium Replacement - Follow Nurse / BPA Driven Protocol    sodium chloride    sodium chloride    Assessment & Plan   Assessment & Plan     Active Hospital Problems    Diagnosis  POA    **Influenza [J11.1]  Yes    Moderate protein-calorie malnutrition [E44.0]  Yes    Hypoxia [R09.02]  Unknown    Urinary tract infection, site not specified [N39.0]  Yes      Resolved Hospital Problems   No resolved problems to display.        Brief Hospital Course to date:  Ashley Younger is a 84 y.o. female with PMH significant for dementia, GERD, HLD, HTN presents to [facility] ED for complaint of hypoxia. Patient experiencing flu-like symptoms x 2 days, found to be Influenza A + on arrival. Discovered have UTI as well.     This patient's problems and plans were partially entered by my partner and updated as appropriate by me 02/16/25.        Influenza A + - POA  Hypoxia  Remote hx of COPD  - Patient began exhibiting flu-like symptoms 2 days prior to admission, patient's daughter reports patient has hx of COPD. Patient was never prescribed supplemental O2 however, she reportedly shared supplemental O2 with her family member  - CXR in ED negative for acute process  - Patient is at an increased risk of serious complications from influenza due to her age (> 66 y/o) and PMH significant for dementia, initiated on Tamiflu x 5 days  -Initially required high flow but now weaned to 4 L nasal cannula. Further weaning as tolerated  - started on IV steroids-can start to taper down  - Complete 5-day course of azithromycin  - patient is DNR/DNI     UTI - POA  - Remote hx of a procedure to reduce occurrence of UTI at [place] per patient's daughter  - U/A in ED + nitrites, trace leuks, 2+ blood, 6-10 WBCs, 4+ bacteria  - Urine cx in process  - has been on rocephin/azithro since admission, continue for now     Dementia  Dysphagia  - Continue Aricept and melatonin  - Modified diet  --complicates all aspects of care     HTN  - Continue amlodipine and Lasix     HLD  - Continue statin     GERD  - Continue PPI          Expected Discharge Location and Transportation: back to facility  Expected Discharge   Expected Discharge Date: 2/17/2025; Expected Discharge Time:      VTE Prophylaxis:  Pharmacologic VTE prophylaxis orders are present.         AM-PAC 6 Clicks Score (PT): 13 (02/15/25 0006)    CODE STATUS:   Code Status and Medical Interventions: No CPR (Do Not Attempt to Resuscitate); Limited Support; No intubation (DNI)   Ordered at: 02/11/25 2002     Medical Intervention Limits:    No intubation (DNI)     Level Of Support Discussed With:    Next of Kin (If No Surrogate)     Code Status (Patient has no pulse and is not breathing):    No CPR (Do Not Attempt to Resuscitate)     Medical Interventions (Patient has pulse or is breathing):    Limited Support       Estela Pérez,  MD  02/16/25

## 2025-02-16 NOTE — PLAN OF CARE
Problem: Fall Injury Risk  Goal: Absence of Fall and Fall-Related Injury  Outcome: Progressing  Intervention: Promote Injury-Free Environment  Description: Provide a safe, barrier-free environment that encourages independent activity.  Keep care area uncluttered and well-lighted.  Determine need for increased observation or monitoring.  Avoid use of devices that minimize mobility, such as restraints or indwelling urinary catheter.  Recent Flowsheet Documentation  Taken 2/16/2025 1800 by Michael Bosch RN  Safety Promotion/Fall Prevention:   assistive device/personal items within reach   clutter free environment maintained   safety round/check completed  Taken 2/16/2025 1600 by Michael Bosch RN  Safety Promotion/Fall Prevention:   assistive device/personal items within reach   clutter free environment maintained   safety round/check completed  Taken 2/16/2025 1400 by Michael Bosch RN  Safety Promotion/Fall Prevention:   assistive device/personal items within reach   clutter free environment maintained   safety round/check completed  Taken 2/16/2025 1200 by Michael Bosch RN  Safety Promotion/Fall Prevention:   assistive device/personal items within reach   clutter free environment maintained   safety round/check completed  Taken 2/16/2025 1000 by Michael Bosch RN  Safety Promotion/Fall Prevention:   assistive device/personal items within reach   clutter free environment maintained   safety round/check completed  Taken 2/16/2025 0800 by Michael Bosch RN  Safety Promotion/Fall Prevention:   assistive device/personal items within reach   clutter free environment maintained   safety round/check completed   Goal Outcome Evaluation:

## 2025-02-17 VITALS
WEIGHT: 120 LBS | HEART RATE: 72 BPM | RESPIRATION RATE: 16 BRPM | DIASTOLIC BLOOD PRESSURE: 86 MMHG | HEIGHT: 66 IN | BODY MASS INDEX: 19.29 KG/M2 | SYSTOLIC BLOOD PRESSURE: 126 MMHG | OXYGEN SATURATION: 95 % | TEMPERATURE: 97.7 F

## 2025-02-17 PROCEDURE — 25010000002 METHYLPREDNISOLONE PER 125 MG: Performed by: STUDENT IN AN ORGANIZED HEALTH CARE EDUCATION/TRAINING PROGRAM

## 2025-02-17 PROCEDURE — 94799 UNLISTED PULMONARY SVC/PX: CPT

## 2025-02-17 PROCEDURE — 92526 ORAL FUNCTION THERAPY: CPT | Performed by: SPEECH-LANGUAGE PATHOLOGIST

## 2025-02-17 PROCEDURE — 99239 HOSP IP/OBS DSCHRG MGMT >30: CPT | Performed by: STUDENT IN AN ORGANIZED HEALTH CARE EDUCATION/TRAINING PROGRAM

## 2025-02-17 PROCEDURE — 25010000002 HEPARIN (PORCINE) PER 1000 UNITS

## 2025-02-17 PROCEDURE — 25010000002 CEFTRIAXONE PER 250 MG: Performed by: NURSE PRACTITIONER

## 2025-02-17 RX ORDER — FUROSEMIDE 20 MG/1
20 TABLET ORAL DAILY
Qty: 30 TABLET | Refills: 0 | Status: SHIPPED | OUTPATIENT
Start: 2025-02-18

## 2025-02-17 RX ORDER — PREDNISONE 20 MG/1
40 TABLET ORAL DAILY
Qty: 10 TABLET | Refills: 0 | Status: SHIPPED | OUTPATIENT
Start: 2025-02-17 | End: 2025-02-22

## 2025-02-17 RX ORDER — GUAIFENESIN 600 MG/1
600 TABLET, EXTENDED RELEASE ORAL EVERY 12 HOURS SCHEDULED
Qty: 4 TABLET | Refills: 0 | Status: SHIPPED | OUTPATIENT
Start: 2025-02-17 | End: 2025-02-19

## 2025-02-17 RX ORDER — ATORVASTATIN CALCIUM 20 MG/1
20 TABLET, FILM COATED ORAL NIGHTLY
Qty: 90 TABLET | Refills: 0 | Status: SHIPPED | OUTPATIENT
Start: 2025-02-17

## 2025-02-17 RX ORDER — DONEPEZIL HYDROCHLORIDE 5 MG/1
5 TABLET, FILM COATED ORAL NIGHTLY
Qty: 30 TABLET | Refills: 0 | Status: SHIPPED | OUTPATIENT
Start: 2025-02-17

## 2025-02-17 RX ADMIN — IPRATROPIUM BROMIDE AND ALBUTEROL SULFATE 3 ML: 2.5; .5 SOLUTION RESPIRATORY (INHALATION) at 11:06

## 2025-02-17 RX ADMIN — HEPARIN SODIUM 5000 UNITS: 5000 INJECTION INTRAVENOUS; SUBCUTANEOUS at 10:13

## 2025-02-17 RX ADMIN — GUAIFENESIN 600 MG: 600 TABLET ORAL at 10:14

## 2025-02-17 RX ADMIN — FUROSEMIDE 20 MG: 20 TABLET ORAL at 10:14

## 2025-02-17 RX ADMIN — AMLODIPINE BESYLATE 2.5 MG: 2.5 TABLET ORAL at 10:14

## 2025-02-17 RX ADMIN — IPRATROPIUM BROMIDE AND ALBUTEROL SULFATE 3 ML: 2.5; .5 SOLUTION RESPIRATORY (INHALATION) at 06:47

## 2025-02-17 RX ADMIN — SODIUM CHLORIDE 1000 MG: 900 INJECTION INTRAVENOUS at 05:37

## 2025-02-17 RX ADMIN — METHYLPREDNISOLONE SODIUM SUCCINATE 60 MG: 125 INJECTION INTRAMUSCULAR; INTRAVENOUS at 10:14

## 2025-02-17 RX ADMIN — Medication 10 ML: at 10:14

## 2025-02-17 RX ADMIN — PANTOPRAZOLE SODIUM 40 MG: 40 TABLET, DELAYED RELEASE ORAL at 10:14

## 2025-02-17 NOTE — PLAN OF CARE
"Goal Outcome Evaluation:  Plan of Care Reviewed With: patient        Progress: no change       Anticipated Discharge Disposition (SLP): skilled nursing facility             Treatment Assessment (SLP): suspected, continued, pharyngeal dysphagia, toleration of diet (02/17/25 1340)  Treatment Assessment Comments (SLP): Pt seen for tx this pm. Pt back to regular NC and reports \"feeling better\". No overt s/s of aspiraiton w/ honey thick liquid juice/tea. Attempted all dysphagia exercises but pt frequently would state \"no thanks\". Pt w/ difficulty completing multistep commands (02/17/25 1340)  Plan for Continued Treatment (SLP): continue treatment per plan of care (02/17/25 1340)    "

## 2025-02-17 NOTE — THERAPY TREATMENT NOTE
Acute Care - Speech Language Pathology   Swallow Treatment Note [facility]     Patient Name: Ashley Younger  : 1940  MRN: [id number]  Today's Date: 2025               Admit Date: 2025    Visit Dx:     ICD-10-CM ICD-9-CM   1. Hypoxia  R09.02 799.02   2. Influenza A  J10.1 487.1   3. Acute UTI (urinary tract infection)  N39.0 599.0   4. Oropharyngeal dysphagia  R13.12 787.22     Patient Active Problem List   Diagnosis    Common bile duct (CBD) obstruction    Hypertension    Sepsis    Fracture of left shoulder girdle, part unspecified, subsequent encounter for fracture with routine healing    Muscle weakness (generalized)    Urinary tract infection, site not specified    Wedge compression fracture of t5-T6 vertebra, subsequent encounter for fracture with routine healing    Encephalopathy, unspecified    Fall at home    Dehydration    Cholangitis    Influenza    Hypoxia    Moderate protein-calorie malnutrition     Past Medical History:   Diagnosis Date    BENJIE (acute kidney injury)     Arthritis     Cholelithiasis     Chronic kidney disease, stage 3 unspecified     DJD (degenerative joint disease)     Encounter for removal of sutures     GERD (gastroesophageal reflux disease)     Headache     History of  brain aneurysm     Hyperlipidemia     Hypertension     Orthostatic hypotension     Osteoarthritis     Presence of neurostimulator     Recurrent falls     Renal insufficiency     Scalp laceration     Urinary tract infection      Past Surgical History:   Procedure Laterality Date    CHOLECYSTECTOMY      CRANIAL NEUROSTIMULATOR INSERTION/REPLACEMENT      TUMOR    ERCP      WITH STONE REMOVED    ORIF WRIST FRACTURE Left        SLP Recommendation and Plan     SLP Diet Recommendation: mechanical ground textures, honey thick liquids, no mixed consistencies (25 1340)  Recommended Precautions and Strategies: general aspiration precautions, assist with feeding, no straw, alternate between small bites of  "food and sips of liquid, other (see comments) (cue pt for throat clear + extra intermittently) (02/17/25 1340)  SLP Rec. for Method of Medication Administration: meds whole, meds crushed, with puree, as tolerated (02/17/25 1340)     Monitor for Signs of Aspiration: yes, notify SLP if any concerns (02/17/25 1340)        Anticipated Discharge Disposition (SLP): skilled nursing facility (02/17/25 1340)     Therapy Frequency (Swallow): 5 days per week (02/17/25 1340)  Predicted Duration Therapy Intervention (Days): 1 week (02/17/25 1340)  Oral Care Recommendations: Oral Care BID/PRN, Suction toothbrush (02/17/25 1340)        Daily Summary of Progress (SLP): progress towards functional goals is fair (02/17/25 1340)               Treatment Assessment (SLP): suspected, continued, pharyngeal dysphagia, toleration of diet (02/17/25 1340)  Treatment Assessment Comments (SLP): Pt seen for tx this pm. Pt back to regular NC and reports \"feeling better\". No overt s/s of aspiraiton w/ honey thick liquid juice/tea. Attempted all dysphagia exercises but pt frequently would state \"no thanks\". Pt w/ difficulty completing multistep commands (02/17/25 1340)  Plan for Continued Treatment (SLP): continue treatment per plan of care (02/17/25 1340)         Progress: no change      SWALLOW EVALUATION (Last 72 Hours)       SLP Adult Swallow Evaluation       Row Name 02/17/25 1340 02/14/25 1530                Rehab Evaluation    Document Type therapy note (daily note)  -CJ therapy note (daily note)  -DV       Subjective Information no complaints  -CJ no complaints  -DV       Patient Observations alert;cooperative;agree to therapy  -CJ alert;cooperative  -DV       Patient/Family/Caregiver Comments/Observations no family present  -CJ no family present  -DV       Patient Effort adequate  -CJ good  -DV       Symptoms Noted During/After Treatment none  -CJ none  -DV          Pain    Pretreatment Pain Rating -- 0/10 - no pain  -DV       " "Posttreatment Pain Rating -- 0/10 - no pain  -DV       Additional Documentation Pain Scale: FACES Pre/Post-Treatment (Group)  - --          Pain Scale: FACES Pre/Post-Treatment    Pain: FACES Scale, Pretreatment 0-->no hurt  -CJ --       Posttreatment Pain Rating 0-->no hurt  -CJ --          SLP Treatment Clinical Impressions    Treatment Assessment (SLP) suspected;continued;pharyngeal dysphagia;toleration of diet  - suspected;continued;pharyngeal dysphagia  -DV       Treatment Assessment Comments (SLP) Pt seen for tx this pm. Pt back to regular NC and reports \"feeling better\". No overt s/s of aspiraiton w/ honey thick liquid juice/tea. Attempted all dysphagia exercises but pt frequently would state \"no thanks\". Pt w/ difficulty completing multistep commands  - Pt continues on HFNC. No s/sx aspiration with current diet of mechanical soft solids and honey-thick liquids. Attempted to teach compensations and dysphagia exercises, but pt not comprehending most of it.  -DV       Daily Summary of Progress (SLP) progress towards functional goals is fair  - progress toward functional goals as expected  -DV       Plan for Continued Treatment (SLP) continue treatment per plan of care  - continue treatment per plan of care  -DV       Care Plan Review care plan/treatment goals reviewed  - evaluation/treatment results reviewed;care plan/treatment goals reviewed;risks/benefits reviewed;current/potential barriers reviewed;patient/other agree to care plan  -DV          Recommendations    Therapy Frequency (Swallow) 5 days per week  -CJ --       Predicted Duration Therapy Intervention (Days) 1 week  - --       SLP Diet Recommendation mechanical ground textures;honey thick liquids;no mixed consistencies  - --       Recommended Precautions and Strategies general aspiration precautions;assist with feeding;no straw;alternate between small bites of food and sips of liquid;other (see comments)  cue pt for throat clear + " extra intermittently  -CJ --       Oral Care Recommendations Oral Care BID/PRN;Suction toothbrush  -CJ --       SLP Rec. for Method of Medication Administration meds whole;meds crushed;with puree;as tolerated  -CJ --       Monitor for Signs of Aspiration yes;notify SLP if any concerns  -CJ --       Anticipated Discharge Disposition (SLP) skilled nursing facility  -CJ --                 User Key  (r) = Recorded By, (t) = Taken By, (c) = Cosigned By      Initials Name Effective Dates    CJ Zonia Burnett, MS CCC-SLP 01/21/25 -     DV Brooke Santos MS CCC-SLP 06/16/21 -                     EDUCATION  The patient has been educated in the following areas:   Dysphagia (Swallowing Impairment) Oral Care/Hydration.        SLP GOALS       Row Name 02/17/25 1340 02/14/25 1530          (LTG) Patient will demonstrate functional swallow for    Diet Texture (Demonstrate functional swallow) regular textures  -CJ regular textures  -DV     Liquid viscosity (Demonstrate functional swallow) thin liquids  - thin liquids  -DV     [place] (Demonstrate functional swallow) with minimal cues (75-90% accuracy)  -CJ with minimal cues (75-90% accuracy)  -DV     Time Frame (Demonstrate functional swallow) 1 week  -CJ 1 week  -DV     Progress/Outcomes (Demonstrate functional swallow) progress slower than expected  -CJ continuing progress toward goal  -DV        (STG) Patient will tolerate trials of    Consistencies Trialed (Tolerate trials) mechanical ground textures;honey/ moderately thick liquids  - mechanical ground textures;honey/ moderately thick liquids  -DV     Desired Outcome (Tolerate trials) without signs/symptoms of aspiration;with adequate oral prep/transit/clearance;with use of compensatory strategies (see comments)  -CJ without signs/symptoms of aspiration;with adequate oral prep/transit/clearance;with use of compensatory strategies (see comments)  -DV     [place] (Tolerate trials) with 1:1 assist/ supervision  -CJ  with 1:1 assist/ supervision  -DV     Time Frame (Tolerate trials) 1 week  -CJ 1 week  -DV     Progress/Outcomes (Tolerate trials) continuing progress toward goal  -CJ continuing progress toward goal  -DV     Comment (Tolerate trials) no overt s/s w/ juice/tea  -CJ no s/sx any trials  -DV        (STG) Lingual Strengthening Goal 1 (SLP)    Activity (Lingual Strengthening Goal 1, SLP) increase lingual tone/sensation/control/coordination/movement;increase tongue back strength  -CJ increase lingual tone/sensation/control/coordination/movement;increase tongue back strength  -DV     Increase Lingual Tone/Sensation/Control/Coordination/Movement lingual resistance exercises;swallow trials  -CJ lingual resistance exercises;swallow trials  -DV     Increase Tongue Back Strength lingual resistance exercises  -CJ lingual resistance exercises  -DV     [place]/Accuracy (Lingual Strengthening Goal 1, SLP) with moderate cues (50-74% accuracy)  -CJ with moderate cues (50-74% accuracy)  -DV     Time Frame (Lingual Strengthening Goal 1, SLP) 1 week  -CJ 1 week  -DV     Progress/Outcomes (Lingual Strengthening Goal 1, SLP) continuing progress toward goal  -CJ continuing progress toward goal  -DV     Comment (Lingual Strengthening Goal 1, SLP) -- completed  -DV        (STG) Pharyngeal Strengthening Exercise Goal 1 (SLP)    Activity (Pharyngeal Strengthening Goal 1, SLP) increase timing;increase superior movement of the hyolaryngeal complex;increase anterior movement of the hyolaryngeal complex;increase closure at entrance to airway/closure of airway at glottis;increase squeeze/positive pressure generation  -CJ increase timing;increase superior movement of the hyolaryngeal complex;increase anterior movement of the hyolaryngeal complex;increase closure at entrance to airway/closure of airway at glottis;increase squeeze/positive pressure generation  -DV     Increase Timing prepping - 3 second prep or suck swallow or 3-step swallow  -CJ  prepping - 3 second prep or suck swallow or 3-step swallow  -DV     Increase Superior Movement of the Hyolaryngeal Complex effortful pitch glide (falsetto + pharyngeal squeeze)  -CJ effortful pitch glide (falsetto + pharyngeal squeeze)  -DV     Increase Anterior Movement of the Hyolaryngeal Complex chin tuck against resistance (CTAR)  -CJ chin tuck against resistance (CTAR)  -DV     Increase Closure at Entrance to Airway/Closure of Airway at Glottis supraglottic swallow  -CJ supraglottic swallow  -DV     Increase Squeeze/Positive Pressure Generation hard effortful swallow  -CJ hard effortful swallow  -DV     [place]/Accuracy (Pharyngeal Strengthening Goal 1, SLP) with moderate cues (50-74% accuracy)  -CJ with moderate cues (50-74% accuracy)  -DV     Time Frame (Pharyngeal Strengthening Goal 1, SLP) 1 week  -CJ 1 week  -DV     Progress/Outcomes (Pharyngeal Strengthening Goal 1, SLP) progress slower than expected  -CJ continuing progress toward goal  -DV     Comment (Pharyngeal Strengthening Goal 1, SLP) attempetd all, difficulty w/ mutli step commands. most success pitch glide, CTAR  -CJ completed falsetto, attempted others with little success  -DV               User Key  (r) = Recorded By, (t) = Taken By, (c) = Cosigned By      Initials Name Provider Type    Zonia Melgar MS CCC-SLP Speech and Language Pathologist    Brooke Corcoran MS CCC-SLP Speech and Language Pathologist                         Time Calculation:    Time Calculation- SLP       Row Name 02/17/25 1412             Time Calculation- SLP    SLP Start Time 1340  -CJ      SLP Received On 02/17/25  -         Untimed Charges    81322-KD Treatment Swallow Minutes 25  -CJ         Total Minutes    Untimed Charges Total Minutes 25  -CJ       Total Minutes 25  -CJ                User Key  (r) = Recorded By, (t) = Taken By, (c) = Cosigned By      Initials Name Provider Type    Zonia Melgar MS CCC-SLP Speech and Language Pathologist                     Therapy Charges for Today       Code Description Service Date Service Provider Modifiers Qty    [id number]  ST TREATMENT SWALLOW 2 2/17/2025 Zonia Burnett, MS CCC-SLP GN 1                 Zonia Burnett MS CCC-SLP  2/17/2025

## 2025-02-17 NOTE — PLAN OF CARE
Problem: Adult Inpatient Plan of Care  Goal: Plan of Care Review  Outcome: Progressing  Flowsheets  Taken 2/15/2025 1605 by Jovani Sheth PT  Plan of Care Reviewed With: patient  Taken 2/12/2025 0954 by Marti Ventura OT  Progress: no change  Goal: Patient-Specific Goal (Individualized)  Outcome: Progressing  Goal: Absence of Hospital-Acquired Illness or Injury  Outcome: Progressing  Intervention: Identify and Manage Fall Risk  Recent Flowsheet Documentation  Taken 2/17/2025 0200 by Chidi Malik RN  Safety Promotion/Fall Prevention: safety round/check completed  Taken 2/16/2025 2357 by Chidi Malik RN  Safety Promotion/Fall Prevention: safety round/check completed  Taken 2/16/2025 2200 by Chidi Malik RN  Safety Promotion/Fall Prevention: safety round/check completed  Taken 2/16/2025 2000 by Chidi Malik RN  Safety Promotion/Fall Prevention: assistive device/personal items within reach  Intervention: Prevent Skin Injury  Recent Flowsheet Documentation  Taken 2/17/2025 0300 by Chidi Malik RN  Body Position:   turned   right  Taken 2/16/2025 2300 by Chidi Malik RN  Body Position:   turned   supine  Taken 2/16/2025 2000 by Chidi Malik RN  Body Position: position maintained  Goal: Optimal Comfort and Wellbeing  Outcome: Progressing  Goal: Readiness for Transition of Care  Outcome: Progressing     Problem: Violence Risk or Actual  Goal: Anger and Impulse Control  Outcome: Progressing  Intervention: Minimize Safety Risk  Recent Flowsheet Documentation  Taken 2/16/2025 2000 by Chidi Malik RN  Enhanced Safety Measures: bed alarm set     Problem: Fall Injury Risk  Goal: Absence of Fall and Fall-Related Injury  Outcome: Progressing  Intervention: Promote Injury-Free Environment  Recent Flowsheet Documentation  Taken 2/17/2025 0200 by Chidi Malik RN  Safety Promotion/Fall Prevention: safety round/check completed  Taken 2/16/2025 2357 by Chidi Malik RN  Safety Promotion/Fall Prevention: safety round/check  completed  Taken 2/16/2025 2200 by Chidi Malik RN  Safety Promotion/Fall Prevention: safety round/check completed  Taken 2/16/2025 2000 by Chidi Malik RN  Safety Promotion/Fall Prevention: assistive device/personal items within reach     Problem: Skin Injury Risk Increased  Goal: Skin Health and Integrity  Outcome: Progressing  Intervention: Optimize Skin Protection  Recent Flowsheet Documentation  Taken 2/17/2025 0200 by Chidi Malik RN  Pressure Reduction Devices:   foam padding utilized   pressure-redistributing mattress utilized  Taken 2/16/2025 2357 by Cihdi Malik RN  Pressure Reduction Devices:   foam padding utilized   pressure-redistributing mattress utilized  Taken 2/16/2025 2200 by Chidi Malik RN  Pressure Reduction Devices:   foam padding utilized   pressure-redistributing mattress utilized  Taken 2/16/2025 2000 by Chidi Malik RN  Activity Management: activity encouraged  Head of Bed (HOB) Positioning: HOB elevated  Pressure Reduction Devices:   pressure-redistributing mattress utilized   foam padding utilized     Problem: Comorbidity Management  Goal: Maintenance of COPD Symptom Control  Outcome: Progressing  Goal: Maintenance of Osteoarthritis Symptom Control  Outcome: Progressing  Intervention: Maintain Osteoarthritis Symptom Control  Recent Flowsheet Documentation  Taken 2/16/2025 2000 by Chidi Malik RN  Activity Management: activity encouraged     Problem: Pulmonary Impairment  Goal: Improved Activity Tolerance  Outcome: Progressing  Goal: Effective Airway Clearance  Outcome: Progressing  Goal: Optimal Gas Exchange  Outcome: Progressing     Problem: Noninvasive Ventilation Acute  Goal: Effective Unassisted Ventilation and Oxygenation  Outcome: Progressing   Goal Outcome Evaluation:         SHERI overnight, VSS plan of care ongoing

## 2025-02-17 NOTE — DISCHARGE SUMMARY
[facility] Medicine Services  DISCHARGE SUMMARY    Patient Name: Ashley Younger  : 1940  MRN: [id number]    Date of Admission: 2025 10:59 AM  Date of Discharge:  2025  Primary Care Physician: John Chen MD    Consults       No orders found from 2025 to 2025.            Hospital Course     Presenting Problem: influenza A    Active Hospital Problems    Diagnosis  POA    **Influenza [J11.1]  Yes    Moderate protein-calorie malnutrition [E44.0]  Yes    Hypoxia [R09.02]  Unknown    Urinary tract infection, site not specified [N39.0]  Yes      Resolved Hospital Problems   No resolved problems to display.          Hospital Course:  Ashley Younger is a 84 y.o. female with PMH significant for dementia, GERD, HLD, HTN presents to [facility] ED for complaint of hypoxia. Patient experiencing flu-like symptoms x 2 days, found to be Influenza A + on arrival. Discovered have UTI as well.  Chest x-ray obtained in ED was negative for any acute process.Patient is at an increased risk of serious complications from influenza due to her age (> 66 y/o) and PMH significant for dementia, initiated on Tamiflu x 5 days .  Her hospital course was complicated by worsening hypoxic respiratory failure that required high flow oxygen at 1 point.  However with supportive care and addition of steroids she was eventually able to be weaned to nasal cannula.  She is improved greatly from a standpoint of oxygen requirements.  She is on 4 L of nasal cannula but only wears it in one nare.  She was also noted to have a UTI which was present on admission and treated with antibiotics for this.  Other home medications continued as previously prescribed.      Discharge Follow Up Recommendations for outpatient labs/diagnostics:   PCP    Day of Discharge     HPI:   No new overnight events, waiting comfortably    Review of Systems  Gen- No fevers, chills  CV- No chest pain, palpitations  Resp- No  cough, dyspnea  GI- No N/V/D, abd pain      Vital Signs:   Temp:  [96.4 °F (35.8 °C)-97.8 °F (36.6 °C)] 97.7 °F (36.5 °C)  Heart Rate:  [68-97] 72  Resp:  [16] 16  BP: (116-138)/(61-86) 126/86  Flow (L/min) (Oxygen Therapy):  [4] 4      Physical Exam:  GEN: NAD, resting in bed, awake  NECK: supple, no masses  RESP: Nasal cannula is off to the side, fair respiratory effort, clear anteriorly to auscultation  CV: on tele, sinus rhythm  PSYCH: normal affect, appropriate  NEURO: awake, alert, no focal deficits noted  MSK: no edema noted  SKIN: no rashes noted        Pertinent  and/or Most Recent Results     LAB RESULTS:      Lab 02/15/25  0809 02/13/25  0127 02/12/25  0834 02/11/25  1148   WBC 8.84 9.44 6.35 4.84   HEMOGLOBIN 14.3 14.2 13.5 13.2   HEMATOCRIT 45.6 45.4 43.4 42.2   PLATELETS 169 160 165 164   NEUTROS ABS 7.72* 6.66 3.79 2.63   IMMATURE GRANS (ABS) 0.05 0.02 0.02 0.01   LYMPHS ABS 0.77 1.83 1.50 1.27   MONOS ABS 0.30 0.90 0.99* 0.89   EOS ABS 0.00 0.01 0.03 0.02   MCV 98.1* 99.6* 99.5* 99.1*   PROCALCITONIN  --  0.07  --   --    LACTATE  --  1.5  --  0.7         Lab 02/15/25  1657 02/15/25  0809 02/13/25  0127 02/12/25  0834 02/11/25  1148   SODIUM  --  145 139 142 141   POTASSIUM 3.7 3.3* 3.9 4.0 3.9   CHLORIDE  --  100 99 100 103   CO2  --  33.0* 26.0 31.0* 30.0*   ANION GAP  --  12.0 14.0 11.0 8.0   BUN  --  26* 23 21 18   CREATININE  --  0.77 0.86 0.94 0.75   EGFR  --  77.1 67.5 60.7 79.6   GLUCOSE  --  179* 109* 89 104*   CALCIUM  --  8.4* 8.3* 8.2* 8.3*   MAGNESIUM  --   --   --  1.9 1.9   PHOSPHORUS  --   --   --  3.8  --          Lab 02/15/25  0809 02/13/25  0127 02/12/25  0834 02/11/25  1148   TOTAL PROTEIN 6.6 6.7 6.5 6.7   ALBUMIN 3.9 3.6 3.8 3.9   GLOBULIN 2.7 3.1 2.7 2.8   ALT (SGPT) 9 9 12 9   AST (SGOT) 17 25 30 31   BILIRUBIN 0.4 0.5 0.5 0.4   ALK PHOS 74 73 72 76                     Lab 02/13/25  0128   PH, ARTERIAL 7.456*   PCO2, ARTERIAL 46.7*   PO2 ART 75.2*   FIO2 90   HCO3 ART 32.9*    BASE EXCESS ART 7.8*   CARBOXYHEMOGLOBIN 0.8     Brief Urine Lab Results  (Last result in the past 365 days)        Color   Clarity   Blood   Leuk Est   Nitrite   Protein   CREAT   Urine HCG        02/12/25 1558 Dark Yellow   Turbid   Large (3+)   Moderate (2+)   Positive   Trace                 Microbiology Results (last 10 days)       Procedure Component Value - Date/Time    Blood Culture - Blood, Hand, Right [[id number]]  (Normal) Collected: 02/13/25 0127    Lab Status: Preliminary result Specimen: Blood from Hand, Right Updated: 02/17/25 0430     Blood Culture No growth at 4 days    Narrative:      Less than seven (7) mL's of blood was collected.  Insufficient quantity may yield false negative results.    Blood Culture - Blood, Arm, Right [[id number]]  (Normal) Collected: 02/13/25 0127    Lab Status: Preliminary result Specimen: Blood from Arm, Right Updated: 02/17/25 0430     Blood Culture No growth at 4 days    Urine Culture - Urine, Urine, Clean Catch [[id number]] Collected: 02/12/25 1558    Lab Status: Final result Specimen: Urine, Clean Catch Updated: 02/14/25 1159     Urine Culture >100,000 CFU/mL Mixed Marilee Isolated    Narrative:      Specimen contains mixed organisms of questionable pathogenicity suggestive of contamination. If symptoms persist, suggest recollection.    COVID PRE-OP / PRE-PROCEDURE SCREENING ORDER (NO ISOLATION) - Swab, Nasopharynx [[id number]]  (Abnormal) Collected: 02/11/25 1128    Lab Status: Final result Specimen: Swab from Nasopharynx Updated: 02/11/25 1346    Narrative:      The following orders were created for panel order COVID PRE-OP / PRE-PROCEDURE SCREENING ORDER (NO ISOLATION) - Swab, Nasopharynx.  Procedure                               Abnormality         Status                     ---------                               -----------         ------                     COVID-19, FLU A/B, RSV P...[[id number]]  Abnormal            Final result                 Please view results  for these tests on the individual orders.    COVID-19, FLU A/B, RSV PCR 1 HR TAT - Swab, Nasopharynx [[id number]]  (Abnormal) Collected: 02/11/25 1128    Lab Status: Final result Specimen: Swab from Nasopharynx Updated: 02/11/25 1346     COVID19 Not Detected     Influenza A PCR Detected     Influenza B PCR Not Detected     RSV, PCR Not Detected    Narrative:      Fact sheet for providers: https://www.fda.gov/media/090481/download    Fact sheet for patients: https://www.fda.gov/media/164661/download    Test performed by PCR.            XR Chest 1 View    Result Date: 2/14/2025  XR CHEST 1 VW Date of Exam: 2/14/2025 2:12 AM EST Indication: hypoxia/ flu Comparison: 2/12/2025. Findings: Right lung base demonstrate some interval clearing with persistent perihilar opacities and interstitial prominence suggesting mild edema, with a small left pleural effusion. There is no distinct pneumothorax or new focal lobar consolidation. Unchanged heart and mediastinal contours.     Impression: Right lung base demonstrate some interval clearing with persistent perihilar opacities and interstitial prominence suggesting mild edema, with a small left pleural effusion. There is no distinct pneumothorax or new focal lobar consolidation. Unchanged heart and mediastinal contours. Electronically Signed: Valente Monge MD  2/14/2025 7:29 AM EST  Workstation ID: [id number]    XR Chest 1 View    Result Date: 2/12/2025  XR CHEST 1 VW Date of Exam: 2/12/2025 10:30 PM EST Indication: hypoxia Comparison: 2/12/2025 Findings: Cardiomediastinal silhouette similar to prior examination. Patient is rotated to the right. Persistent right basal airspace disease and small bilateral pleural effusions. No acute osseous abnormality identified.     Impression: No significant change Electronically Signed: Mitchell Leary MD  2/12/2025 10:44 PM EST  Workstation ID: [id number]    FL Video Swallow With Speech Single Contrast    Result Date: 2/12/2025  FL VIDEO SWALLOW W  SPEECH SINGLE-CONTRAST Date of Exam: 2/12/2025 2:11 PM EST Indication: dysphagia.   Comparison: None available. Technique:   The speech pathologist administered food and/or liquid mixed with barium to the patient with cine/video imaging.  Imaging assistance was provided to the speech pathologist and an image was saved. Fluoroscopic Time: 3 minutes 36 seconds Number of Images: 7 cine loops Findings: The patient was evaluated in the seated lateral position while taking a variety of consistencies by mouth under the direction of speech pathology. There is consistent deep penetration. No definite aspiration was observed. Please see the speech pathologist's procedure report for full details and recommendations.     Impression: Fluoroscopy provided during modified barium swallow. Electronically Signed: Manoj Hernandez MD  2/12/2025 2:54 PM EST  Workstation ID: [id number]    XR Chest 1 View    Result Date: 2/12/2025  XR CHEST 1 VW Date of Exam: 2/12/2025 12:06 PM EST Indication: worsening oxygen req, flu Comparison: 2/11/2025 Findings: Patient is again rotated to the right. The heart is borderline enlarged. Vasculature appears normal. Mild generalized coarsening of the pulmonary interstitial markings is unchanged but there is mildly increased patchy opacity in the right lung base, which may represent atelectasis or a small focus of developing pneumonia. There appears to be a small right effusion. No pneumothorax is identified. Deformity of the left proximal humerus is consistent with old healed fracture.     Impression: 1. Mildly increased patchy opacity of the right lung base and suspected small effusion, potentially a developing pneumonia. 2. Stable appearance of the chest elsewhere. Electronically Signed: Manoj Hernandez MD  2/12/2025 12:30 PM EST  Workstation ID: [id number]    XR Chest 1 View    Result Date: 2/11/2025  XR CHEST 1 VW Date of Exam: 2/11/2025 11:18 AM EST Indication: productive cough, dementia with aspiration risk  Comparison: AP chest 3/18/2025. Findings: Low volume inspiration. No acute airspace disease is seen. Heart size appears borderline prominent, favored to represent accentuated contour related to kyphotic positioning. No pleural effusion or pneumothorax or acute osseous abnormalities are identified. Upper lumbar levoscoliosis. Cholecystectomy.     Impression: No acute cardiopulmonary findings. Electronically Signed: Maria Del Carmen Zuniga MD  2/11/2025 11:59 AM EST  Workstation ID: [id number]                 Plan for Follow-up of Pending Labs/Results: f/u w PCP  Pending Labs       Order Current Status    Blood Culture - Blood, Arm, Right Preliminary result    Blood Culture - Blood, Hand, Right Preliminary result          Discharge Details        Discharge Medications        New Medications        Instructions Start Date   atorvastatin 20 MG tablet  Commonly known as: LIPITOR   20 mg, Oral, Nightly      donepezil 5 MG tablet  Commonly known as: ARICEPT   5 mg, Oral, Nightly      furosemide 20 MG tablet  Commonly known as: LASIX   20 mg, Oral, Daily   Start Date: February 18, 2025     guaiFENesin 600 MG 12 hr tablet  Commonly known as: MUCINEX   600 mg, Oral, Every 12 Hours Scheduled      melatonin 5 MG tablet tablet   5 mg, Oral, Nightly      predniSONE 20 MG tablet  Commonly known as: DELTASONE   40 mg, Oral, Daily             Continue These Medications        Instructions Start Date   acetaminophen 325 MG tablet  Commonly known as: TYLENOL   650 mg, Oral, Every 6 Hours PRN      amLODIPine 2.5 MG tablet  Commonly known as: NORVASC   2.5 mg, Oral, 2 Times Daily      heparin (porcine) 5000 UNIT/ML injection   1 mL, Every 12 Hours Scheduled      pantoprazole 40 MG EC tablet  Commonly known as: PROTONIX   40 mg, Oral, Daily             Stop These Medications      levoFLOXacin 500 MG tablet  Commonly known as: LEVAQUIN     metoprolol tartrate 25 MG tablet  Commonly known as: LOPRESSOR     metroNIDAZOLE 500 MG tablet  Commonly known  as: FLAGYL     MiraLax 17 GM/SCOOP powder  Generic drug: polyethylene glycol     oxyCODONE 5 MG immediate release tablet  Commonly known as: ROXICODONE     potassium chloride 20 MEQ CR tablet  Commonly known as: KLOR-CON M20              Allergies   Allergen Reactions    Penicillins Unknown - High Severity     Tolerated Rocephin 10/3/23 per med hx.    Tolerated Rocephin per med hx 10/3/23         Discharge Disposition:  Long Term Care (DC - External)[facility]    Diet:  Hospital:  Diet Order   Procedures    Diet: Regular/House; No Mixed Consistencies, No Straw, Feeding Assistance - Nursing; Texture: Mechanical Ground (NDD 2); Fluid Consistency: Honey Thick            Activity:  as tolerated    Restrictions or Other Recommendations:  none       CODE STATUS:    Code Status and Medical Interventions: No CPR (Do Not Attempt to Resuscitate); Limited Support; No intubation (DNI)   Ordered at: 02/11/25 1714     Medical Intervention Limits:    No intubation (DNI)     Level Of Support Discussed With:    Next of Kin (If No Surrogate)     Code Status (Patient has no pulse and is not breathing):    No CPR (Do Not Attempt to Resuscitate)     Medical Interventions (Patient has pulse or is breathing):    Limited Support       Future Appointments   Date Time Provider Department Center   2/17/2025  4:00 PM MED 11 [facility] None                 Estela Pérez MD  02/17/25      Time Spent on Discharge:  I spent  45  minutes on this discharge activity which included: face-to-face encounter with the patient, reviewing the data in the system, coordination of the care with the nursing staff as well as consultants, documentation, and entering orders.

## 2025-02-18 LAB
BACTERIA SPEC AEROBE CULT: NORMAL
BACTERIA SPEC AEROBE CULT: NORMAL

## 2025-02-19 NOTE — PROGRESS NOTES
Enter Query Response Below      Query Response: acute hypoxic respiratory failure             If applicable, please update the problem list.